# Patient Record
Sex: FEMALE | Race: WHITE | Employment: FULL TIME | URBAN - METROPOLITAN AREA
[De-identification: names, ages, dates, MRNs, and addresses within clinical notes are randomized per-mention and may not be internally consistent; named-entity substitution may affect disease eponyms.]

---

## 2017-01-16 ENCOUNTER — HOSPITAL ENCOUNTER (OUTPATIENT)
Dept: MAMMOGRAPHY | Age: 57
Discharge: HOME | End: 2017-01-16
Attending: FAMILY MEDICINE
Payer: COMMERCIAL

## 2017-01-16 DIAGNOSIS — Z12.31 ENCOUNTER FOR SCREENING MAMMOGRAM FOR BREAST CANCER: ICD-10-CM

## 2017-01-16 PROCEDURE — 77067 SCR MAMMO BI INCL CAD: CPT

## 2017-03-03 ENCOUNTER — OFFICE VISIT (OUTPATIENT)
Dept: FAMILY MEDICINE CLINIC | Age: 57
End: 2017-03-03

## 2017-03-03 VITALS
SYSTOLIC BLOOD PRESSURE: 152 MMHG | WEIGHT: 157.8 LBS | BODY MASS INDEX: 25.36 KG/M2 | DIASTOLIC BLOOD PRESSURE: 82 MMHG | HEART RATE: 77 BPM | RESPIRATION RATE: 16 BRPM | TEMPERATURE: 98 F | HEIGHT: 66 IN | OXYGEN SATURATION: 98 %

## 2017-03-03 DIAGNOSIS — I10 ESSENTIAL HYPERTENSION: ICD-10-CM

## 2017-03-03 DIAGNOSIS — F41.1 GAD (GENERALIZED ANXIETY DISORDER): Primary | ICD-10-CM

## 2017-03-03 DIAGNOSIS — M54.50 ACUTE BILATERAL LOW BACK PAIN WITHOUT SCIATICA: ICD-10-CM

## 2017-03-03 DIAGNOSIS — L98.9 SKIN PROBLEM: ICD-10-CM

## 2017-03-03 RX ORDER — FLUOXETINE 10 MG/1
20 CAPSULE ORAL DAILY
Qty: 60 CAP | Refills: 0 | Status: SHIPPED | OUTPATIENT
Start: 2017-03-03 | End: 2017-03-15 | Stop reason: DRUGHIGH

## 2017-03-03 NOTE — MR AVS SNAPSHOT
Visit Information Date & Time Provider Department Dept. Phone Encounter #  
 3/3/2017  2:40 PM Leo Spicer MD [address] [facility] [phone] [id number] Upcoming Health Maintenance Date Due Hepatitis C Screening 1960 DTaP/Tdap/Td series (1 - Tdap) 5/6/1981 FOBT Q 1 YEAR AGE 50-75 5/6/2010 PAP AKA CERVICAL CYTOLOGY 12/10/2017 BREAST CANCER SCRN MAMMOGRAM 1/16/2019 Allergies as of 3/3/2017  Review Complete On: 3/3/2017 By: Tegan Banda No Known Allergies Current Immunizations  Reviewed on 11/7/2016 Name Date Influenza Vaccine 10/14/2015 Influenza Vaccine (Quad) PF 11/7/2016 Not reviewed this visit You Were Diagnosed With   
  
 Codes Comments MARIA VICTORIA (generalized anxiety disorder)    -  Primary ICD-10-CM: F41.1 ICD-9-CM: 300.02 Essential hypertension     ICD-10-CM: I10 
ICD-9-CM: 401.9 Acute bilateral low back pain without sciatica     ICD-10-CM: M54.5 ICD-9-CM: 724.2, 338.19 Skin problem     ICD-10-CM: L98.9 ICD-9-CM: 709.9 Vitals BP  
  
  
  
  
  
 152/82 (BP 1 Location: Right arm, BP Patient Position: Sitting) BMI and BSA Data Body Mass Index Body Surface Area  
 25.47 kg/m 2 1.83 m 2 Preferred Pharmacy Pharmacy Name Phone [facility], [address] [phone] Your Updated Medication List  
  
   
This list is accurate as of: 3/3/17  3:30 PM.  Always use your most recent med list.  
  
  
  
  
 aspirin 81 mg Cpdr  
Take  by mouth. cyclobenzaprine 10 mg tablet Commonly known as:  FLEXERIL Take 1 Tab by mouth three (3) times daily as needed for Muscle Spasm(s). FLUoxetine 10 mg capsule Commonly known as:  PROzac Take 2 Caps by mouth daily. hydroCHLOROthiazide 25 mg tablet Commonly known as:  HYDRODIURIL  
TAKE ONE-HALF TABLET BY MOUTH EVERY DAY FOR FLUID  
  
 HYDROcodone-acetaminophen 5-325 mg per tablet Commonly known as:  Birda Cayuga Take 1 Tab by mouth every six (6) hours as needed for Pain. Max Daily Amount: 4 Tabs. meloxicam 15 mg tablet Commonly known as:  MOBIC Take 1 Tab by mouth daily. For back pain  
  
 methylPREDNISolone 4 mg tablet Commonly known as:  Cleta Loss Per dose pack instructions NexIUM 20 mg capsule Generic drug:  esomeprazole Take  by mouth daily. VALTREX 1 gram tablet Generic drug:  valACYclovir Take  by mouth. Prescriptions Sent to Pharmacy Refills FLUoxetine (PROZAC) 10 mg capsule 0 Sig: Take 2 Caps by mouth daily. Class: Normal  
 Pharmacy: [address] #: [phone] Route: Oral  
  
Introducing [facility]! Clarissa Hoang introduces [employer] patient portal. Now you can access parts of your medical record, email your doctor's office, and request medication refills online. 1. In your internet browser, go to [email or web address] 2. Click on the First Time User? Click Here link in the Sign In box. You will see the New Member Sign Up page. 3. Enter your [employer] Access Code exactly as it appears below. You will not need to use this code after youve completed the sign-up process. If you do not sign up before the expiration date, you must request a new code. · [employer] Access Code: [email or web address] Expires: 6/1/2017  3:30 PM 
 
4. Enter the last four digits of your Social Security Number (xxxx) and Date of Birth (mm/dd/yyyy) as indicated and click Submit. You will be taken to the next sign-up page. 5. Create a [employer] ID. This will be your [employer] login ID and cannot be changed, so think of one that is secure and easy to remember. 6. Create a [employer] password. You can change your password at any time. 7. Enter your Password Reset Question and Answer.  This can be used at a later time if you forget your password. 8. Enter your e-mail address. You will receive e-mail notification when new information is available in [address] 9. Click Sign Up. You can now view and download portions of your medical record. 10. Click the Download Summary menu link to download a portable copy of your medical information. If you have questions, please visit the Frequently Asked Questions section of the [employer] website. Remember, [employer] is NOT to be used for urgent needs. For medical emergencies, dial 911. Now available from your iPhone and Android! Please provide this summary of care documentation to your next provider. Your primary care clinician is listed as [address] If you have any questions after today's visit, please call [phone].

## 2017-03-03 NOTE — PROGRESS NOTES
.  Chief Complaint   Patient presents with    Spasms    Skin Problem     spot has been growing for the past 3 months    Bud Hansen

## 2017-03-03 NOTE — PROGRESS NOTES
RUDY  Jacinda Kumar is a 64 y.o. female who presents to follow-up on anxiety and discuss back pain. She was started on Prozac 10 mg about 3 months ago and was told to follow-up in 1 month but is only now getting around to it. Has not noticed much difference in her anxiety. Describes the fact that she has had anxiety her entire life. She gets easily overwhelmed, occasionally has a heart flutter that scares her. 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 She has been tried on SSRIs in the past but complains of a decrease in libido. She has never tried a therapist.    She has a spot on her back is worrying her, center of the back around the bra line that she describes as a discoloration. There is no pain. Occasional itching. No trauma to the area    Says that she has been having back problems for quite a while. There is no injury. Only seems to happen when she first wakes up in the morning. The pain is in the lower thoracic spine. Describes it as a shooting sensation if he twists the wrong way. She has been doing some stretches but mostly back extension stretches    PMHx:  Past Medical History:   Diagnosis Date    Depression 2/26/2010    Hypertension 2/26/2010       Meds:   Current Outpatient Prescriptions   Medication Sig Dispense Refill    FLUoxetine (PROZAC) 10 mg capsule Take 2 Caps by mouth daily. 60 Cap 0    valACYclovir (VALTREX) 1 gram tablet Take  by mouth.  hydrochlorothiazide (HYDRODIURIL) 25 mg tablet TAKE ONE-HALF TABLET BY MOUTH EVERY DAY FOR FLUID 30 Tab 5    meloxicam (MOBIC) 15 mg tablet Take 1 Tab by mouth daily. For back pain 30 Tab 2    esomeprazole (NEXIUM) 20 mg capsule Take  by mouth daily. Allergies:   No Known Allergies    Smoker:  History   Smoking Status    Never Smoker   Smokeless Tobacco    Never Used       ETOH:   History   Alcohol Use No       FH: No family history on file.     ROS:  As listed in HPI. In addition:  Constitutional:   No headache, fever, fatigue, weight loss or weight gain      Cardiac:    No chest pain      Resp:   No cough or shortness of breath      Neuro   No loss of consciousness, dizziness, seizures      Physical Exam:  Blood pressure 152/82, pulse 77, temperature 98 °F (36.7 °C), resp. rate 16, height 5' 6\" (1.676 m), weight 157 lb 12.8 oz (71.6 kg), SpO2 98 %. GEN: No apparent distress. Alert and oriented and responds to all questions appropriately. NECK:  Supple; no masses; thyroid normal           LUNGS: Respirations unlabored; clear to auscultation bilaterally  CARDIOVASCULAR: Regular rate and rhythm without murmurs   ABDOMEN: Soft; nontender; nondistended; normoactive bowel sounds; no masses or organomegaly  NEUROLOGIC:  No focal neurologic deficits. Strength and sensation grossly intact. Coordination and gait grossly intact. MSK:  Superior trapezius is mildly tender to palpation, tight erector spinae muscles that are not particularly tender to palpation, in fact no part of her back is tender to palpation at the moment  SKIN: The center of her back in the area of her bra line there is a 8 cm tall by 5 cm wide patch of very subtle eczema       Assessment and Plan     MARIA VICTORIA  Needs a higher dose of Prozac, described a ramp-up using 10 mg tablets up to 40 mg over the next 3 weeks  Needs a therapist, I forgot to give her a list of therapists in the area  Encouraged her to engage in cardiovascular exercise which should help with both anxiety and back pain  Follow-up one month    The back pain that happens first thing in the morning sounds like it is due to a mattress issue, she is also not stretching it out probably, she is extending her back to try to relieve pain which is feeding into the cramp. Educated on back flexion exercises and stretches    The spot on her back is a light eczema, there are no other symptoms to provide reassurance.   If starts to itch can use an over-the-counter steroid cream.  Can use a moisturizing cream in the meantime    Hypertension, a little high today. Has been borderline in the recent past  Recheck in 1 month. If it is still high consider increasing blood pressure medicine        ICD-10-CM ICD-9-CM    1. MARIA VICTORIA (generalized anxiety disorder) F41.1 300.02 FLUoxetine (PROZAC) 10 mg capsule   2. Essential hypertension I10 401.9    3. Acute bilateral low back pain without sciatica M54.5 724.2      338.19    4. Skin problem L98.9 709.9        AVS given.  Pt expressed understanding of instructions

## 2017-03-15 DIAGNOSIS — F41.1 GAD (GENERALIZED ANXIETY DISORDER): ICD-10-CM

## 2017-03-15 RX ORDER — FLUOXETINE 10 MG/1
20 CAPSULE ORAL DAILY
Qty: 60 CAP | Refills: 0 | Status: CANCELLED | OUTPATIENT
Start: 2017-03-15

## 2017-03-15 RX ORDER — FLUOXETINE HYDROCHLORIDE 40 MG/1
40 CAPSULE ORAL DAILY
Qty: 90 CAP | Refills: 3 | Status: SHIPPED | OUTPATIENT
Start: 2017-03-15 | End: 2018-03-29 | Stop reason: SDUPTHER

## 2017-05-05 ENCOUNTER — OFFICE VISIT (OUTPATIENT)
Dept: FAMILY MEDICINE CLINIC | Age: 57
End: 2017-05-05

## 2017-05-05 VITALS
WEIGHT: 155 LBS | DIASTOLIC BLOOD PRESSURE: 79 MMHG | HEIGHT: 66 IN | RESPIRATION RATE: 17 BRPM | SYSTOLIC BLOOD PRESSURE: 155 MMHG | OXYGEN SATURATION: 96 % | HEART RATE: 76 BPM | TEMPERATURE: 98.9 F | BODY MASS INDEX: 24.91 KG/M2

## 2017-05-05 DIAGNOSIS — L03.011 CELLULITIS OF FINGER OF RIGHT HAND: Primary | ICD-10-CM

## 2017-05-05 RX ORDER — CEPHALEXIN 500 MG/1
500 CAPSULE ORAL 2 TIMES DAILY
Qty: 20 CAP | Refills: 0 | Status: SHIPPED | OUTPATIENT
Start: 2017-05-05 | End: 2017-05-15

## 2017-05-05 NOTE — PATIENT INSTRUCTIONS

## 2017-05-05 NOTE — PROGRESS NOTES
Subjective:   Alek Pérez is a 64 y.o. female who complains of first right finger red, warm and swollen for several days. No recalled trauma. No open wound. Was weeding this week and may have pricked herself on a shashi leaf. Not particularly sore, good range of motion. Past Medical History:   Diagnosis Date    Depression 2/26/2010    Hypertension 2/26/2010     Social History   Substance Use Topics    Smoking status: Never Smoker    Smokeless tobacco: Never Used    Alcohol use No     Outpatient Prescriptions Marked as Taking for the 5/5/17 encounter (Office Visit) with Anjelica Beasley MD   Medication Sig Dispense Refill    cephALEXin (KEFLEX) 500 mg capsule Take 1 Cap by mouth two (2) times a day for 10 days. 20 Cap 0    FLUoxetine (PROZAC) 40 mg capsule Take 1 Cap by mouth daily. 90 Cap 3    valACYclovir (VALTREX) 1 gram tablet Take  by mouth.  hydrochlorothiazide (HYDRODIURIL) 25 mg tablet TAKE ONE-HALF TABLET BY MOUTH EVERY DAY FOR FLUID 30 Tab 5    meloxicam (MOBIC) 15 mg tablet Take 1 Tab by mouth daily. For back pain 30 Tab 2    esomeprazole (NEXIUM) 20 mg capsule Take  by mouth daily. No Known Allergies     Review of Systems  A comprehensive review of systems was negative except for that written in the HPI. Objective:     Visit Vitals    /79 (BP 1 Location: Left arm, BP Patient Position: Sitting)    Pulse 76    Temp 98.9 °F (37.2 °C) (Oral)    Resp 17    Ht 5' 6\" (1.676 m)    Wt 155 lb (70.3 kg)    LMP  (LMP Unknown)    SpO2 96%    BMI 25.02 kg/m2     General:   alert, cooperative and no distress   Eyes: conjunctivae/scleras clear. PERRL, EOM's intact                   Heart: S1 and S2 normal,no murmurs noted    Lungs: Clear to auscultation bilaterally, no increased work of breathing       Extremities: Right first finger swollen and red, slightly warm on palmar side, no extension to dorsal surface. Full painless ROM across all joints.   No area of fluctuance, nail and cuticle are not affected. No openwound. Assessment/Plan:   1. Cellulitis of finger of right hand  Cautioned about signs of joint or systemic infection. Start keflex. Follow up prn. Orders Placed This Encounter    cephALEXin (KEFLEX) 500 mg capsule     Sig: Take 1 Cap by mouth two (2) times a day for 10 days. Dispense:  20 Cap     Refill:  0       Verbal and written instructions (see AVS) provided. Patient expresses understanding of diagnosis and treatment plan.

## 2017-05-05 NOTE — MR AVS SNAPSHOT
Visit Information Date & Time Provider Department Dept. Phone Encounter #  
 5/5/2017  1:15 PM Ayad Bear, [address] [facility] [phone] [id number] Upcoming Health Maintenance Date Due Hepatitis C Screening 1960 DTaP/Tdap/Td series (1 - Tdap) 5/6/1981 FOBT Q 1 YEAR AGE 50-75 5/6/2010 INFLUENZA AGE 9 TO ADULT 8/1/2017 PAP AKA CERVICAL CYTOLOGY 12/10/2017 BREAST CANCER SCRN MAMMOGRAM 1/16/2019 Allergies as of 5/5/2017  Review Complete On: 5/5/2017 By: Joseph Avendano No Known Allergies Current Immunizations  Reviewed on 11/7/2016 Name Date Influenza Vaccine 10/14/2015 Influenza Vaccine (Quad) PF 11/7/2016 Not reviewed this visit Vitals BP Pulse Temp Resp Height(growth percentile) Weight(growth percentile) 155/79 (BP 1 Location: Left arm, BP Patient Position: Sitting) 76 98.9 °F (37.2 °C) (Oral) 17 5' 6\" (1.676 m) 155 lb (70.3 kg) LMP SpO2 BMI OB Status Smoking Status (LMP Unknown) 96% 25.02 kg/m2 Postmenopausal Never Smoker BMI and BSA Data Body Mass Index Body Surface Area 25.02 kg/m 2 1.81 m 2 Preferred Pharmacy Pharmacy Name Phone [facility], [address] [phone] Your Updated Medication List  
  
   
This list is accurate as of: 5/5/17  1:33 PM.  Always use your most recent med list.  
  
  
  
  
 cephALEXin 500 mg capsule Commonly known as:  Fauzia Maizes Take 1 Cap by mouth two (2) times a day for 10 days. FLUoxetine 40 mg capsule Commonly known as:  PROzac Take 1 Cap by mouth daily. hydroCHLOROthiazide 25 mg tablet Commonly known as:  HYDRODIURIL  
TAKE ONE-HALF TABLET BY MOUTH EVERY DAY FOR FLUID  
  
 meloxicam 15 mg tablet Commonly known as:  MOBIC Take 1 Tab by mouth daily. For back pain NexIUM 20 mg capsule Generic drug:  esomeprazole Take  by mouth daily. VALTREX 1 gram tablet Generic drug:  valACYclovir Take  by mouth. Prescriptions Sent to Pharmacy Refills  
 cephALEXin (KEFLEX) 500 mg capsule 0 Sig: Take 1 Cap by mouth two (2) times a day for 10 days. Class: Normal  
 Pharmacy: [facility] #: [phone] Route: Oral  
  
Patient Instructions Cellulitis: Care Instructions Your Care Instructions Cellulitis is a skin infection. It often occurs after a break in the skin from a scrape, cut, bite, or puncture, or after a rash. The doctor has checked you carefully, but problems can develop later. If you notice any problems or new symptoms, get medical treatment right away. Follow-up care is a key part of your treatment and safety. Be sure to make and go to all appointments, and call your doctor if you are having problems. It's also a good idea to know your test results and keep a list of the medicines you take. How can you care for yourself at home? · Take your antibiotics as directed. Do not stop taking them just because you feel better. You need to take the full course of antibiotics. · Prop up the infected area on pillows to reduce pain and swelling. Try to keep the area above the level of your heart as often as you can. · If your doctor told you how to care for your wound, follow your doctor's instructions. If you did not get instructions, follow this general advice: ¨ Wash the wound with clean water 2 times a day. Don't use hydrogen peroxide or alcohol, which can slow healing. ¨ You may cover the wound with a thin layer of petroleum jelly, such as Vaseline, and a nonstick bandage. ¨ Apply more petroleum jelly and replace the bandage as needed. · Be safe with medicines. Take pain medicines exactly as directed. ¨ If the doctor gave you a prescription medicine for pain, take it as prescribed. ¨ If you are not taking a prescription pain medicine, ask your doctor if you can take an over-the-counter medicine. To prevent cellulitis in the future · Try to prevent cuts, scrapes, or other injuries to your skin. Cellulitis most often occurs where there is a break in the skin. · If you get a scrape, cut, mild burn, or bite, wash the wound with clean water as soon as you can to help avoid infection. Don't use hydrogen peroxide or alcohol, which can slow healing. · If you have swelling in your legs (edema), support stockings and good skin care may help prevent leg sores and cellulitis. · Take care of your feet, especially if you have diabetes or other conditions that increase the risk of infection. Wear shoes and socks. Do not go barefoot. If you have athlete's foot or other skin problems on your feet, talk to your doctor about how to treat them. When should you call for help? Call your doctor now or seek immediate medical care if: 
· You have signs that your infection is getting worse, such as: 
¨ Increased pain, swelling, warmth, or redness. ¨ Red streaks leading from the area. ¨ Pus draining from the area. ¨ A fever. · You get a rash. Watch closely for changes in your health, and be sure to contact your doctor if: 
· You are not getting better after 1 day (24 hours). · You do not get better as expected. Where can you learn more? Go to http://phyllis-harsh.info/. Randell Cerna in the search box to learn more about \"Cellulitis: Care Instructions. \" Current as of: October 13, 2016 Content Version: 11.2 © 6859-6746 Inversiones.com. Care instructions adapted under license by [employer] (which disclaims liability or warranty for this information). If you have questions about a medical condition or this instruction, always ask your healthcare professional. [address] any warranty or liability for your use of this information. Introducing [facility]! Linda Cleverly introduces [employer] patient portal. Now you can access parts of your medical record, email your doctor's office, and request medication refills online. 1. In your internet browser, go to [email or web address] 2. Click on the First Time User? Click Here link in the Sign In box. You will see the New Member Sign Up page. 3. Enter your [employer] Access Code exactly as it appears below. You will not need to use this code after youve completed the sign-up process. If you do not sign up before the expiration date, you must request a new code. · [employer] Access Code: [email or web address] Expires: 6/1/2017  4:30 PM 
 
4. Enter the last four digits of your Social Security Number (xxxx) and Date of Birth (mm/dd/yyyy) as indicated and click Submit. You will be taken to the next sign-up page. 5. Create a [employer]bit ID. This will be your [employer] login ID and cannot be changed, so think of one that is secure and easy to remember. 6. Create a [employer] password. You can change your password at any time. 7. Enter your Password Reset Question and Answer. This can be used at a later time if you forget your password. 8. Enter your e-mail address. You will receive e-mail notification when new information is available in [address] 9. Click Sign Up. You can now view and download portions of your medical record. 10. Click the Download Summary menu link to download a portable copy of your medical information. If you have questions, please visit the Frequently Asked Questions section of the [employer] website. Remember, [employer] is NOT to be used for urgent needs. For medical emergencies, dial 911. Now available from your iPhone and Android! Please provide this summary of care documentation to your next provider. Your primary care clinician is listed as Farnaz Burns. If you have any questions after today's visit, please call [phone].

## 2017-05-08 ENCOUNTER — TELEPHONE (OUTPATIENT)
Dept: FAMILY MEDICINE CLINIC | Age: 57
End: 2017-05-08

## 2017-05-08 ENCOUNTER — OFFICE VISIT (OUTPATIENT)
Dept: FAMILY MEDICINE CLINIC | Age: 57
End: 2017-05-08

## 2017-05-08 VITALS
TEMPERATURE: 98.1 F | HEIGHT: 66 IN | BODY MASS INDEX: 24.85 KG/M2 | DIASTOLIC BLOOD PRESSURE: 79 MMHG | OXYGEN SATURATION: 97 % | SYSTOLIC BLOOD PRESSURE: 157 MMHG | WEIGHT: 154.6 LBS | HEART RATE: 75 BPM

## 2017-05-08 DIAGNOSIS — L03.011 CELLULITIS OF FINGER OF RIGHT HAND: Primary | ICD-10-CM

## 2017-05-08 RX ORDER — SULFAMETHOXAZOLE AND TRIMETHOPRIM 800; 160 MG/1; MG/1
1 TABLET ORAL 2 TIMES DAILY
Qty: 20 TAB | Refills: 0 | Status: SHIPPED | OUTPATIENT
Start: 2017-05-08 | End: 2017-05-18

## 2017-05-08 NOTE — TELEPHONE ENCOUNTER
Pt hand is now swollen and would like to know what she should do next. Antibiotics prescribed last week has helped finger but now swelling is in hand. Delvis Matos

## 2017-05-08 NOTE — TELEPHONE ENCOUNTER
Appt with Dr. Shanae Melara tomorrow 5-9-17. Arrive at 1:10 for a 1:30 appt.  [address]. Attached to parking deck. Records faxed to [phone].

## 2017-05-08 NOTE — MR AVS SNAPSHOT
Visit Information Date & Time Provider Department Dept. Phone Encounter #  
 5/8/2017  1:00 PM Constance Boland, [address] [address] [phone] [id number] Upcoming Health Maintenance Date Due Hepatitis C Screening 1960 DTaP/Tdap/Td series (1 - Tdap) 5/6/1981 FOBT Q 1 YEAR AGE 50-75 5/6/2010 INFLUENZA AGE 9 TO ADULT 8/1/2017 PAP AKA CERVICAL CYTOLOGY 12/10/2017 BREAST CANCER SCRN MAMMOGRAM 1/16/2019 Allergies as of 5/8/2017  Review Complete On: 5/8/2017 By: Constance Boland MD  
 No Known Allergies Current Immunizations  Reviewed on 11/7/2016 Name Date Influenza Vaccine 10/14/2015 Influenza Vaccine (Quad) PF 11/7/2016 Not reviewed this visit Vitals BP Pulse Temp Height(growth percentile) Weight(growth percentile) LMP  
 157/79 (BP 1 Location: Left arm, BP Patient Position: Sitting) 75 98.1 °F (36.7 °C) 5' 6\" (1.676 m) 154 lb 9.6 oz (70.1 kg) (LMP Unknown) SpO2 BMI OB Status Smoking Status 97% 24.95 kg/m2 Postmenopausal Never Smoker Vitals History BMI and BSA Data Body Mass Index Body Surface Area 24.95 kg/m 2 1.81 m 2 Preferred Pharmacy Pharmacy Name Phone [facility], [address] [phone] Your Updated Medication List  
  
   
This list is accurate as of: 5/8/17  1:49 PM.  Always use your most recent med list.  
  
  
  
  
 cephALEXin 500 mg capsule Commonly known as:  Prieto Norris Take 1 Cap by mouth two (2) times a day for 10 days. FLUoxetine 40 mg capsule Commonly known as:  PROzac Take 1 Cap by mouth daily. hydroCHLOROthiazide 25 mg tablet Commonly known as:  HYDRODIURIL  
TAKE ONE-HALF TABLET BY MOUTH EVERY DAY FOR FLUID  
  
 meloxicam 15 mg tablet Commonly known as:  MOBIC Take 1 Tab by mouth daily. For back pain NexIUM 20 mg capsule Generic drug:  esomeprazole Take  by mouth daily. trimethoprim-sulfamethoxazole 160-800 mg per tablet Commonly known as:  BACTRIM DS, SEPTRA DS Take 1 Tab by mouth two (2) times a day for 10 days. VALTREX 1 gram tablet Generic drug:  valACYclovir Take  by mouth. Prescriptions Sent to Pharmacy Refills  
 trimethoprim-sulfamethoxazole (BACTRIM DS, SEPTRA DS) 160-800 mg per tablet 0 Sig: Take 1 Tab by mouth two (2) times a day for 10 days. Class: Normal  
 Pharmacy: [facility] [address] [place] [address] #: [phone] Route: Oral  
  
Patient Instructions We will call you with an appointment when we hear from [facility] later today. If you have heard nothing by 530 pm, please go to Ortho On Call at [facility] to get orthopedic evaluation. Introducing [facility]! [facility] introduces [employer] patient portal. Now you can access parts of your medical record, email your doctor's office, and request medication refills online. 1. In your internet browser, go to [email or web address] 2. Click on the First Time User? Click Here link in the Sign In box. You will see the New Member Sign Up page. 3. Enter your [employer] Access Code exactly as it appears below. You will not need to use this code after youve completed the sign-up process. If you do not sign up before the expiration date, you must request a new code. · [employer] Access Code: [email or web address] Expires: 6/1/2017  4:30 PM 
 
4. Enter the last four digits of your Social Security Number (xxxx) and Date of Birth (mm/dd/yyyy) as indicated and click Submit. You will be taken to the next sign-up page. 5. Create a LiveAir Networkst ID. This will be your [employer] login ID and cannot be changed, so think of one that is secure and easy to remember. 6. Create a [employer] password. You can change your password at any time. 7. Enter your Password Reset Question and Answer.  This can be used at a later time if you forget your password. 8. Enter your e-mail address. You will receive e-mail notification when new information is available in [address] 9. Click Sign Up. You can now view and download portions of your medical record. 10. Click the Download Summary menu link to download a portable copy of your medical information. If you have questions, please visit the Frequently Asked Questions section of the [employer] website. Remember, [employer] is NOT to be used for urgent needs. For medical emergencies, dial 911. Now available from your iPhone and Android! Please provide this summary of care documentation to your next provider. Your primary care clinician is listed as Mode Hoffmann. If you have any questions after today's visit, please call [phone].

## 2017-05-08 NOTE — PATIENT INSTRUCTIONS
We will call you with an appointment when we hear from [facility] later today. If you have heard nothing by 530 pm, please go to [facility] at [address] [place] [address] to get orthopedic evaluation.

## 2017-05-08 NOTE — PROGRESS NOTES
58yo with right 1st finger swelling and pain. Seen Friday and had some redness and swelling of palmar surface of first finger - thought she may have been pricked by a thorn when gardening earlier in the week. Took keflex and started to feel better, swelling improved a little. Then woken from sleep last night by 1st MCP joint pain with some redness and swelling. Today, the pain is not throbbing but she \"knows it's there\" and was concerned that the swelling had spread so she came in for follow up. She denies fever or chills. Past Medical History, Surgical History, and Social History reviewed. Current Outpatient Prescriptions on File Prior to Visit   Medication Sig Dispense Refill    hydroCHLOROthiazide (HYDRODIURIL) 25 mg tablet TAKE ONE-HALF TABLET BY MOUTH EVERY DAY FOR FLUID 30 Tab 11    cephALEXin (KEFLEX) 500 mg capsule Take 1 Cap by mouth two (2) times a day for 10 days. 20 Cap 0    FLUoxetine (PROZAC) 40 mg capsule Take 1 Cap by mouth daily. 90 Cap 3    valACYclovir (VALTREX) 1 gram tablet Take  by mouth.  meloxicam (MOBIC) 15 mg tablet Take 1 Tab by mouth daily. For back pain 30 Tab 2    esomeprazole (NEXIUM) 20 mg capsule Take  by mouth daily. No current facility-administered medications on file prior to visit. No Known Allergies    Visit Vitals    /79 (BP 1 Location: Left arm, BP Patient Position: Sitting)    Pulse 75    Temp 98.1 °F (36.7 °C)    Ht 5' 6\" (1.676 m)    Wt 154 lb 9.6 oz (70.1 kg)    LMP  (LMP Unknown)    SpO2 97%    BMI 24.95 kg/m2     Gen: alert, oriented, no acute distress  Lungs: no increased respiratory effort, clear to ausculation bilaterally  Heart: regular rate and rhythm, no murmurs, rubs or gallops  Extremities:right 1st finger swollen and red. FROM except as limited by swelling. Redness now encompasses right 1st MCP joint. No tenderness to palpation of PIP or DIP, just some at MCP. No laceration or abrasion. Assessment/Plan:    1. Cellulitis of finger of right hand  Concerning spread of swelling/redness. Minimal heat. Add bactrim for coverage of strep. Arrange ortho hand follow up. Called [facility] and [facility] and no one was able to offer appt. Left message at [facility] and instructed patient to go to [facility] on call if no response by 530. Orders Placed This Encounter    trimethoprim-sulfamethoxazole (BACTRIM DS, SEPTRA DS) 160-800 mg per tablet     Sig: Take 1 Tab by mouth two (2) times a day for 10 days. Dispense:  20 Tab     Refill:  0       Current Outpatient Prescriptions   Medication Sig Dispense Refill    trimethoprim-sulfamethoxazole (BACTRIM DS, SEPTRA DS) 160-800 mg per tablet Take 1 Tab by mouth two (2) times a day for 10 days. 20 Tab 0    hydroCHLOROthiazide (HYDRODIURIL) 25 mg tablet TAKE ONE-HALF TABLET BY MOUTH EVERY DAY FOR FLUID 30 Tab 11    cephALEXin (KEFLEX) 500 mg capsule Take 1 Cap by mouth two (2) times a day for 10 days. 20 Cap 0    FLUoxetine (PROZAC) 40 mg capsule Take 1 Cap by mouth daily. 90 Cap 3    valACYclovir (VALTREX) 1 gram tablet Take  by mouth.  meloxicam (MOBIC) 15 mg tablet Take 1 Tab by mouth daily. For back pain 30 Tab 2    esomeprazole (NEXIUM) 20 mg capsule Take  by mouth daily. Verbal and written instructions (see AVS) provided. Patient expresses understanding of diagnosis and treatment plan.

## 2017-05-08 NOTE — TELEPHONE ENCOUNTER
Gildardo Harden returning [employer] call. She can be reached at [phone]. If she is unavailable, please press 0 and have the  page her.

## 2017-05-10 ENCOUNTER — TELEPHONE (OUTPATIENT)
Dept: FAMILY MEDICINE CLINIC | Age: 57
End: 2017-05-10

## 2017-05-10 RX ORDER — ONDANSETRON 4 MG/1
4 TABLET, ORALLY DISINTEGRATING ORAL
Qty: 20 TAB | Refills: 0 | Status: SHIPPED | OUTPATIENT
Start: 2017-05-10 | End: 2017-10-05

## 2017-05-11 ENCOUNTER — TELEPHONE (OUTPATIENT)
Dept: FAMILY MEDICINE CLINIC | Age: 57
End: 2017-05-11

## 2017-05-11 NOTE — TELEPHONE ENCOUNTER
Discussed with Jenniffer-we can switch Bactrim for Doxycycline, but common side effect of doxy is nausea.

## 2017-05-11 NOTE — TELEPHONE ENCOUNTER
Pt explained she is still very ill and nauseated. She is requesting for a different antibiotic to be sent to [facility]. She has not discontinued med for fear of swelling to come back to hand.   Ac Patrick

## 2017-05-17 ENCOUNTER — TELEPHONE (OUTPATIENT)
Dept: FAMILY MEDICINE CLINIC | Age: 57
End: 2017-05-17

## 2017-05-17 NOTE — TELEPHONE ENCOUNTER
Pt states that she is taking bactrim once daily now due to nausea and feeling tired, is taking Zofran, which is not really working. She vomited only once last week. She is also eating before taking medication and drinking lots of water. Pt wants to know what to do. Routing to Dr Ly Pfeiffer.

## 2017-05-17 NOTE — TELEPHONE ENCOUNTER
Patient would like to speak to a nurse but did not say what she wanted.  She can be reached at [phone]

## 2017-06-18 NOTE — TELEPHONE ENCOUNTER
This message was never closed by the last clinical person that touch it. Please close the encounter so it will drop out of the PSR's in basket.     Thank you

## 2017-06-22 ENCOUNTER — OFFICE VISIT (OUTPATIENT)
Dept: FAMILY MEDICINE CLINIC | Age: 57
End: 2017-06-22

## 2017-06-22 VITALS
WEIGHT: 153.6 LBS | HEIGHT: 66 IN | HEART RATE: 68 BPM | TEMPERATURE: 97.9 F | BODY MASS INDEX: 24.68 KG/M2 | SYSTOLIC BLOOD PRESSURE: 150 MMHG | DIASTOLIC BLOOD PRESSURE: 78 MMHG | RESPIRATION RATE: 18 BRPM | OXYGEN SATURATION: 97 %

## 2017-06-22 DIAGNOSIS — R39.15 URINARY URGENCY: ICD-10-CM

## 2017-06-22 DIAGNOSIS — R35.0 URINARY FREQUENCY: Primary | ICD-10-CM

## 2017-06-22 DIAGNOSIS — R10.2 SUPRAPUBIC DISCOMFORT: ICD-10-CM

## 2017-06-22 LAB
BILIRUB UR QL STRIP: NEGATIVE
GLUCOSE UR-MCNC: NEGATIVE MG/DL
KETONES P FAST UR STRIP-MCNC: NEGATIVE MG/DL
PH UR STRIP: 5 [PH] (ref 4.6–8)
PROT UR QL STRIP: NEGATIVE MG/DL
SP GR UR STRIP: 1.02 (ref 1–1.03)
UA UROBILINOGEN AMB POC: NORMAL (ref 0.2–1)
URINALYSIS CLARITY POC: CLEAR
URINALYSIS COLOR POC: YELLOW
URINE BLOOD POC: NEGATIVE
URINE LEUKOCYTES POC: NEGATIVE
URINE NITRITES POC: NEGATIVE

## 2017-06-22 RX ORDER — DOXYCYCLINE 100 MG/1
100 CAPSULE ORAL 2 TIMES DAILY
Qty: 20 CAP | Refills: 0 | Status: SHIPPED | OUTPATIENT
Start: 2017-06-22 | End: 2017-07-02

## 2017-06-22 NOTE — PATIENT INSTRUCTIONS
Urethritis: Care Instructions  Your Care Instructions    Urethritis is an infection of the tube that takes urine from the bladder to the outside of the body. This tube is called the urethra. The infection is often caused by bacteria. This can happen if you have a sexually transmitted infection (STI). But a virus may also be a cause. Urethritis is usually treated with antibiotics. Most cases clear up with treatment. Proper treatment is very important. If you don't treat it, the infection can lead to lasting damage of the urethra. Other parts of the urinary system can also be damaged. Follow-up care is a key part of your treatment and safety. Be sure to make and go to all appointments, and call your doctor if you are having problems. It's also a good idea to know your test results and keep a list of the medicines you take. How can you care for yourself at home? · If your doctor prescribed antibiotics, take them as directed. Do not stop taking them just because you feel better. You need to take the full course of antibiotics. · Take an over-the-counter pain medicine, such as acetaminophen (Tylenol), ibuprofen (Advil, Motrin), or naproxen (Aleve), if needed. Be safe with medicines. Read and follow all instructions on the label. · Do not take two or more pain medicines at the same time unless the doctor told you to. Many pain medicines have acetaminophen, which is Tylenol. Too much acetaminophen (Tylenol) can be harmful. · Your doctor may have you take phenazopyridine (Pyridium). This is a pain medicine for the urinary tract. It can turn your urine a deep red-orange. This is normal. Call your doctor if you think you are having a problem with your medicine. · Do not have sex until you are done with treatment. If you do have sex, be sure to use a condom. Your sex partner or partners should be tested too if your urethritis was caused by an STI.   · If your infection was caused by an injury or chemicals, avoid those things if you can. When should you call for help? Call your doctor now or seek immediate medical care if:  · You can't urinate. · You have symptoms of a urinary infection. For example:  ¨ You have blood or pus in your urine. ¨ You have pain in your back just below your rib cage. This is called flank pain. ¨ You have a fever, chills, or body aches. ¨ It hurts to urinate. ¨ You have groin or belly pain. · You have a hard time urinating when your bladder is full. · You notice mental changes or feel confused. Watch closely for changes in your health, and be sure to contact your doctor if:  · You do not get better as expected. Where can you learn more? Go to http://phyllis-harsh.info/. Enter W067 in the search box to learn more about \"Urethritis: Care Instructions. \"  Current as of: August 12, 2016  Content Version: 11.3  © 5647-0710 [employer]. Care instructions adapted under license by [employer] (which disclaims liability or warranty for this information). If you have questions about a medical condition or this instruction, always ask your healthcare professional. [address] any warranty or liability for your use of this information.

## 2017-06-22 NOTE — PROGRESS NOTES
Subjective:      Chief Complaint   Patient presents with    Urinary Frequency       Patient : This patient is a 62 y.o. female that presents today with a chief complaint urgency and frequency, no dysuria. She says that she has had a HA and feeling tired, yesterday had some stomach cramps \"gas pain, like I was constipated. I went to the bathroom and the cramps went away\". Symptoms started about three days ago. The patient denies nausea, vomiting, and fever. Has taken some aleve for HA the past couple days. The patient has no history of recent or recurrent UTIs. She was on Bactrim about a month ago for a hand infection. Says that she had a left over Cephalexin (which she was on prior to the bactrim)  and took 2 doses yesterday to treat potential UTI. No vaginal discharge or odor. Has not had intercourse for about a month. Past Medical History:   Diagnosis Date    Depression 2/26/2010    Hypertension 2/26/2010     Current Outpatient Prescriptions   Medication Sig    ondansetron (ZOFRAN ODT) 4 mg disintegrating tablet Take 1 Tab by mouth every eight (8) hours as needed for Nausea.  hydroCHLOROthiazide (HYDRODIURIL) 25 mg tablet TAKE ONE-HALF TABLET BY MOUTH EVERY DAY FOR FLUID    FLUoxetine (PROZAC) 40 mg capsule Take 1 Cap by mouth daily.  valACYclovir (VALTREX) 1 gram tablet Take  by mouth.  meloxicam (MOBIC) 15 mg tablet Take 1 Tab by mouth daily. For back pain    esomeprazole (NEXIUM) 20 mg capsule Take  by mouth daily. No current facility-administered medications for this visit. No Known Allergies  Social History   Substance Use Topics    Smoking status: Never Smoker    Smokeless tobacco: Never Used    Alcohol use No       ROS per HPI.     Objective:     Visit Vitals    /78 (BP 1 Location: Left arm, BP Patient Position: Sitting)    Pulse 68    Temp 97.9 °F (36.6 °C) (Oral)    Resp 18    Ht 5' 6\" (1.676 m)    Wt 153 lb 9.6 oz (69.7 kg)    LMP  (LMP Unknown)    SpO2 97%    BMI 24.79 kg/m2       Skin: no pallor, normal turgor  HEENT:  Normocephalic, no conjunctival inflammation, pupils equal round and reactive to light, MMM, no oral lesions  Heart: regular rate and rhythm, no murmurs, rubs or gallops  Lungs: no increased respiratory effort, clear to ausculation bilaterally  Abdomen: soft, mild suprapubic tenderness, not distended. Normal bowel sounds. No rebound or guarding. No bruits. Back: no costovertebral angle tenderness  Extremities:no edema or cyanosis  Neuro awake, alert and oriented    Results for orders placed or performed in visit on 06/22/17   AMB POC URINALYSIS DIP STICK AUTO W/O MICRO     Status: None   Result Value Ref Range Status    Color (UA POC) Yellow  Final    Clarity (UA POC) Clear  Final    Glucose (UA POC) Negative Negative Final    Bilirubin (UA POC) Negative Negative Final    Ketones (UA POC) Negative Negative Final    Specific gravity (UA POC) 1.025 1.001 - 1.035 Final    Blood (UA POC) Negative Negative Final    pH (UA POC) 5.0 4.6 - 8.0 Final    Protein (UA POC) Negative Negative mg/dL Final    Urobilinogen (UA POC) 0.2 mg/dL 0.2 - 1 Final    Nitrites (UA POC) Negative Negative Final    Leukocyte esterase (UA POC) Negative Negative Final       Assessment:   Differential diagnosis and treatment options reviewed with patient who is in agreement with treatment plan as outlined below. ICD-10-CM ICD-9-CM    1. Urinary frequency R35.0 788.41 AMB POC URINALYSIS DIP STICK AUTO W/O MICRO      CULTURE, URINE      CT/NG/T.VAGINALIS AMPLIFICATION      doxycycline (MONODOX) 100 mg capsule   2. Suprapubic discomfort R10.2 789.09    3. Urinary urgency R39.15 788.63 doxycycline (MONODOX) 100 mg capsule       ICD-10-CM ICD-9-CM    1. Urinary frequency R35.0 788.41 AMB POC URINALYSIS DIP STICK AUTO W/O MICRO      CULTURE, URINE      CT/NG/T.VAGINALIS AMPLIFICATION      doxycycline (MONODOX) 100 mg capsule   2. Suprapubic discomfort R10.2 789.09    3. Urinary urgency R39.15 788.63 doxycycline (MONODOX) 100 mg capsule     UA in office negative. Increase water hydration. May take Azo OTC for urinary symptom relief. Will send urine for culture and STI check to rule out other causes of urinary symptoms. UA may be affected by antibiotic taken yesterday. Will start on Doxy for potential Urethritis. Medication profile discussed with patient. Encouraged to take with food and avoid sun exposure while on medication. Discussed BMI and healthy weight. Encouraged patient to work to implement changes including diet high in raw fruits and vegetables, lean protein and good fats. Limit refined, processed carbohydrates and sugar. Encouraged regular exercise. Call/follow up  if any new symptoms or worsening of symptoms. Verbal and written instructions (see AVS) provided. Patient expresses understanding of diagnosis and treatment plan.

## 2017-06-22 NOTE — MR AVS SNAPSHOT
Visit Information Date & Time Provider Department Dept. Phone Encounter #  
 6/22/2017 10:00 AM Luan Gutierrez NP [address] [facility] 219 [phone] [id number] Upcoming Health Maintenance Date Due Hepatitis C Screening 1960 DTaP/Tdap/Td series (1 - Tdap) 5/6/1981 FOBT Q 1 YEAR AGE 50-75 5/6/2010 INFLUENZA AGE 9 TO ADULT 8/1/2017 PAP AKA CERVICAL CYTOLOGY 12/10/2017 BREAST CANCER SCRN MAMMOGRAM 1/16/2019 Allergies as of 6/22/2017  Review Complete On: 6/22/2017 By: Lior Otero LPN No Known Allergies Current Immunizations  Reviewed on 11/7/2016 Name Date Influenza Vaccine 10/14/2015 Influenza Vaccine (Quad) PF 11/7/2016 Not reviewed this visit You Were Diagnosed With   
  
 Codes Comments Urinary frequency    -  Primary ICD-10-CM: R35.0 ICD-9-CM: 788.41 Suprapubic discomfort     ICD-10-CM: R10.2 ICD-9-CM: 789.09 Urinary urgency     ICD-10-CM: R39.15 ICD-9-CM: 816.14 Vitals BP Pulse Temp Resp Height(growth percentile) Weight(growth percentile) 150/78 (BP 1 Location: Left arm, BP Patient Position: Sitting) 68 97.9 °F (36.6 °C) (Oral) 18 5' 6\" (1.676 m) 153 lb 9.6 oz (69.7 kg) LMP SpO2 BMI OB Status Smoking Status (LMP Unknown) 97% 24.79 kg/m2 Postmenopausal Never Smoker Vitals History BMI and BSA Data Body Mass Index Body Surface Area 24.79 kg/m 2 1.8 m 2 Preferred Pharmacy Pharmacy Name Phone [facility] [phone] Your Updated Medication List  
  
   
This list is accurate as of: 6/22/17 10:31 AM.  Always use your most recent med list.  
  
  
  
  
 doxycycline 100 mg capsule Commonly known as:  Ramirez Ivans Take 1 Cap by mouth two (2) times a day for 10 days. FLUoxetine 40 mg capsule Commonly known as:  PROzac Take 1 Cap by mouth daily. hydroCHLOROthiazide 25 mg tablet Commonly known as:  HYDRODIURIL  
TAKE ONE-HALF TABLET BY MOUTH EVERY DAY FOR FLUID  
  
 meloxicam 15 mg tablet Commonly known as:  MOBIC Take 1 Tab by mouth daily. For back pain NexIUM 20 mg capsule Generic drug:  esomeprazole Take  by mouth daily. ondansetron 4 mg disintegrating tablet Commonly known as:  ZOFRAN ODT Take 1 Tab by mouth every eight (8) hours as needed for Nausea. VALTREX 1 gram tablet Generic drug:  valACYclovir Take  by mouth. Prescriptions Sent to Pharmacy Refills  
 doxycycline (MONODOX) 100 mg capsule 0 Sig: Take 1 Cap by mouth two (2) times a day for 10 days. Class: Normal  
 Pharmacy: [address] #: [phone] Route: Oral  
  
We Performed the Following AMB POC URINALYSIS DIP STICK AUTO W/O MICRO [79590 CPT(R)] CT/NG/T.VAGINALIS AMPLIFICATION [id number] CPT(R)] CULTURE, URINE [id number] CPT(R)] Patient Instructions Urethritis: Care Instructions Your Care Instructions Urethritis is an infection of the tube that takes urine from the bladder to the outside of the body. This tube is called the urethra. The infection is often caused by bacteria. This can happen if you have a sexually transmitted infection (STI). But a virus may also be a cause. Urethritis is usually treated with antibiotics. Most cases clear up with treatment. Proper treatment is very important. If you don't treat it, the infection can lead to lasting damage of the urethra. Other parts of the urinary system can also be damaged. Follow-up care is a key part of your treatment and safety. Be sure to make and go to all appointments, and call your doctor if you are having problems. It's also a good idea to know your test results and keep a list of the medicines you take. How can you care for yourself at home? · If your doctor prescribed antibiotics, take them as directed.  Do not stop taking them just because you feel better. You need to take the full course of antibiotics. · Take an over-the-counter pain medicine, such as acetaminophen (Tylenol), ibuprofen (Advil, Motrin), or naproxen (Aleve), if needed. Be safe with medicines. Read and follow all instructions on the label. · Do not take two or more pain medicines at the same time unless the doctor told you to. Many pain medicines have acetaminophen, which is Tylenol. Too much acetaminophen (Tylenol) can be harmful. · Your doctor may have you take phenazopyridine (Pyridium). This is a pain medicine for the urinary tract. It can turn your urine a deep red-orange. This is normal. Call your doctor if you think you are having a problem with your medicine. · Do not have sex until you are done with treatment. If you do have sex, be sure to use a condom. Your sex partner or partners should be tested too if your urethritis was caused by an STI. · If your infection was caused by an injury or chemicals, avoid those things if you can. When should you call for help? Call your doctor now or seek immediate medical care if: 
· You can't urinate. · You have symptoms of a urinary infection. For example: ¨ You have blood or pus in your urine. ¨ You have pain in your back just below your rib cage. This is called flank pain. ¨ You have a fever, chills, or body aches. ¨ It hurts to urinate. ¨ You have groin or belly pain. · You have a hard time urinating when your bladder is full. · You notice mental changes or feel confused. Watch closely for changes in your health, and be sure to contact your doctor if: 
· You do not get better as expected. Where can you learn more? Go to http://phyllis-harsh.info/. Enter H788 in the search box to learn more about \"Urethritis: Care Instructions. \" Current as of: August 12, 2016 Content Version: 11.3 © 5508-6329 [employer].  Care instructions adapted under license by [address] (which disclaims liability or warranty for this information). If you have questions about a medical condition or this instruction, always ask your healthcare professional. [address] any warranty or liability for your use of this information. Introducing [facility]! [employer] introduces [employer] patient portal. Now you can access parts of your medical record, email your doctor's office, and request medication refills online. 1. In your internet browser, go to [email or web address] 2. Click on the First Time User? Click Here link in the Sign In box. You will see the New Member Sign Up page. 3. Enter your [employer] Access Code exactly as it appears below. You will not need to use this code after youve completed the sign-up process. If you do not sign up before the expiration date, you must request a new code. · [employer] Access Code: [email or web address] Expires: 9/20/2017 10:31 AM 
 
4. Enter the last four digits of your Social Security Number (xxxx) and Date of Birth (mm/dd/yyyy) as indicated and click Submit. You will be taken to the next sign-up page. 5. Create a Invuity ID. This will be your Inv[employer] login ID and cannot be changed, so think of one that is secure and easy to remember. 6. Create a [employer] password. You can change your password at any time. 7. Enter your Password Reset Question and Answer. This can be used at a later time if you forget your password. 8. Enter your e-mail address. You will receive e-mail notification when new information is available in [address] 9. Click Sign Up. You can now view and download portions of your medical record. 10. Click the Download Summary menu link to download a portable copy of your medical information. If you have questions, please visit the Frequently Asked Questions section of the [employer] website.  Remember, [employer] is NOT to be used for urgent needs. For medical emergencies, dial 911. Now available from your iPhone and Android! Please provide this summary of care documentation to your next provider. Your primary care clinician is listed as [address] If you have any questions after today's visit, please call [phone].

## 2017-06-23 LAB — BACTERIA UR CULT: NO GROWTH

## 2017-06-26 ENCOUNTER — TELEPHONE (OUTPATIENT)
Dept: FAMILY MEDICINE CLINIC | Age: 57
End: 2017-06-26

## 2017-06-26 LAB
C TRACH RRNA SPEC QL NAA+PROBE: NEGATIVE
N GONORRHOEA RRNA SPEC QL NAA+PROBE: NEGATIVE
T VAGINALIS RRNA SPEC QL NAA+PROBE: NEGATIVE

## 2017-06-26 NOTE — PROGRESS NOTES
Can you call and ask where the STD urine swab is? Urine Culture was negative but have not received those results.    Thanks   Donnie MEDINA

## 2017-06-27 ENCOUNTER — TELEPHONE (OUTPATIENT)
Dept: FAMILY MEDICINE CLINIC | Age: 57
End: 2017-06-27

## 2017-06-27 DIAGNOSIS — R10.2 SUPRAPUBIC DISCOMFORT: ICD-10-CM

## 2017-06-27 DIAGNOSIS — R35.0 URINARY FREQUENCY: Primary | ICD-10-CM

## 2017-06-27 NOTE — PROGRESS NOTES
I was waiting for the rest of the urine to result before calling her. Please let her know that her Urine culture and her STD check were both negative. If she is feeling better on the antibiotic, then she should complete the entire dose. If she is not feeling any better then consider referral to urology.    Thanks  Lamar Simpson [facility]

## 2017-06-27 NOTE — TELEPHONE ENCOUNTER
----- Message from Xu Bee NP sent at 6/27/2017  8:07 AM EDT -----  I was waiting for the rest of the urine to result before calling her. Please let her know that her Urine culture and her STD check were both negative. If she is feeling better on the antibiotic, then she should complete the entire dose. If she is not feeling any better then consider referral to urology.    Thanks  Radha Brito [facility]

## 2017-06-27 NOTE — TELEPHONE ENCOUNTER
Pt notified and verbalized understanding. Pt states that she is still having symptoms and would like to be seen by urology. Appt scheduled today 6-27-17 at 3:20. Pt needs to arrive at 3:00 to register. [facility] [address]. Records faxed to [phone].

## 2017-06-29 ENCOUNTER — HOSPITAL ENCOUNTER (OUTPATIENT)
Dept: MRI IMAGING | Age: 57
Discharge: HOME | End: 2017-06-29
Attending: OBSTETRICS & GYNECOLOGY
Payer: COMMERCIAL

## 2017-06-29 DIAGNOSIS — N89.8 VAGINAL CYSTS: ICD-10-CM

## 2017-06-29 PROCEDURE — 74011250636 HC RX REV CODE- 250/636: Performed by: OBSTETRICS & GYNECOLOGY

## 2017-06-29 PROCEDURE — 72197 MRI PELVIS W/O & W/DYE: CPT

## 2017-06-29 PROCEDURE — A9576 INJ PROHANCE MULTIPACK: HCPCS | Performed by: OBSTETRICS & GYNECOLOGY

## 2017-06-29 RX ADMIN — GADOTERIDOL 14 ML: 279.3 INJECTION, SOLUTION INTRAVENOUS at 18:13

## 2017-09-27 ENCOUNTER — OFFICE VISIT (OUTPATIENT)
Dept: FAMILY MEDICINE CLINIC | Age: 57
End: 2017-09-27

## 2017-09-27 VITALS
WEIGHT: 153 LBS | DIASTOLIC BLOOD PRESSURE: 80 MMHG | SYSTOLIC BLOOD PRESSURE: 142 MMHG | HEART RATE: 80 BPM | OXYGEN SATURATION: 96 % | HEIGHT: 66 IN | RESPIRATION RATE: 12 BRPM | BODY MASS INDEX: 24.59 KG/M2

## 2017-09-27 DIAGNOSIS — Z11.59 NEED FOR HEPATITIS C SCREENING TEST: ICD-10-CM

## 2017-09-27 DIAGNOSIS — Z00.00 WELL ADULT EXAM: Primary | ICD-10-CM

## 2017-09-27 DIAGNOSIS — F41.1 GAD (GENERALIZED ANXIETY DISORDER): ICD-10-CM

## 2017-09-27 DIAGNOSIS — Z23 ENCOUNTER FOR IMMUNIZATION: ICD-10-CM

## 2017-09-27 DIAGNOSIS — I10 ESSENTIAL HYPERTENSION: ICD-10-CM

## 2017-09-27 DIAGNOSIS — Z12.11 COLON CANCER SCREENING: ICD-10-CM

## 2017-09-27 RX ORDER — HYDROCHLOROTHIAZIDE 25 MG/1
25 TABLET ORAL DAILY
Qty: 90 TAB | Refills: 3 | Status: SHIPPED | OUTPATIENT
Start: 2017-09-27 | End: 2018-09-21 | Stop reason: SDUPTHER

## 2017-09-27 NOTE — PROGRESS NOTES
Tdap and Influenza Immunizations administered 9/27/2017 by Lisa Ordaz with patients consent. Patient tolerated procedure well. No reactions noted. VIS given.

## 2017-09-27 NOTE — PROGRESS NOTES
RUDY  Tani Jauregui is a 62 y.o. female who presents to follow-up on anxiety and hypertension. Took the opportunity to do a wellness visit. We had increased her dose of Prozac to 40 mg back in March. She felt like this gave her good relief and felt like her anxiety was under good control for several months. About the time school started up again she started noticing that she was having more episodes of anxiety during the day. Particular in the morning when she was getting ready and at night when she was relaxing. Tells me that school is stressful, has 2 teenagers with autism and getting them ready for school and helping out with after school activities is a stressor although I had to pry in order to find this out. She initially volunteered that she was under her typical level of stress. Has not established with a counselor yet. Does not check her blood pressure at home, has noticed less to doctor's visits she has been to outside of the system that her blood pressure has been elevated. She had an MRI done of the pelvic area and had earplugs for the noise but has noticed that ever since the MRI she has had a ringing or ear. Has not tried anything for this. PMHx:  Past Medical History:   Diagnosis Date    Depression 2/26/2010    Hypertension 2/26/2010       Meds:   Current Outpatient Prescriptions   Medication Sig Dispense Refill    hydroCHLOROthiazide (HYDRODIURIL) 25 mg tablet Take 1 Tab by mouth daily. 90 Tab 3    FLUoxetine (PROZAC) 40 mg capsule Take 1 Cap by mouth daily. 90 Cap 3    valACYclovir (VALTREX) 1 gram tablet Take  by mouth.  esomeprazole (NEXIUM) 20 mg capsule Take  by mouth daily.  ondansetron (ZOFRAN ODT) 4 mg disintegrating tablet Take 1 Tab by mouth every eight (8) hours as needed for Nausea. 20 Tab 0    meloxicam (MOBIC) 15 mg tablet Take 1 Tab by mouth daily.  For back pain 30 Tab 2       Allergies:   No Known Allergies    Smoker:  History   Smoking Status    Never Smoker   Smokeless Tobacco    Never Used       ETOH:   History   Alcohol Use No       FH:   Family History   Problem Relation Age of Onset    Heart Disease Brother      cancer       ROS:   As listed in HPI. In addition:  Constitutional:   No headache, fever, fatigue, weight loss or weight gain      Cardiac:    No chest pain      Resp:   No cough or shortness of breath      Neuro   No loss of consciousness, dizziness, seizures      Physical Exam:  Blood pressure 146/83, pulse 80, resp. rate 12, height 5' 6\" (1.676 m), weight 153 lb (69.4 kg), SpO2 96 %. GEN: No apparent distress. Alert and oriented and responds to all questions appropriately. NEUROLOGIC:  No focal neurologic deficits. Strength and sensation grossly intact. Coordination and gait grossly intact. EXT: Well perfused. No edema. SKIN: No obvious rashes. Lungs clear to auscultation bilaterally  CV regular rate and rhythm no murmur  HEENT, clear tympanic membranes, clear nares, clear oropharynx       Assessment and Plan     Well adult  Due for Pap smear with Dr. Key Dean at [facility] in a few months  Flu shot today  Tdap today  Discussed colonoscopy versus fit kit. She would like to fit kit    Been several years since we got labs, surveillance labs today including TSH for anxiety    Hypertension  Increase HCTZ 25 mg (was taking 12.5 mg)    Tinnitus  Expect to take several months for resolution. No obvious hearing loss, recommend white noise generator for bedtime if she is bothered by it at night    Labs reviewed  ACS risk 6.8%, will probably need to consider cholesterol medication by the time that she is 60 if nothing else changes. ICD-10-CM ICD-9-CM    1. Well adult exam Z00.00 V70.0    2. MARIA VICTORIA (generalized anxiety disorder) F41.1 300.02    3.  Essential hypertension I10 401.9 TSH 3RD GENERATION      METABOLIC PANEL, COMPREHENSIVE      CBC WITH AUTOMATED DIFF      LIPID PANEL   4. Encounter for immunization Z23 V03.89 TETANUS, DIPHTHERIA TOXOIDS AND ACELLULAR PERTUSSIS VACCINE (TDAP), IN INDIVIDS. >=7, IM      INFLUENZA VIRUS VAC QUAD,SPLIT,PRESV FREE SYRINGE IM      ND IMMUNIZ ADMIN,1 SINGLE/COMB VAC/TOXOID      ND IMMUNIZ,ADMIN,EACH ADDL   5. Need for hepatitis C screening test Z11.59 V73.89 HCV AB W/RFLX TO SESAR   6. Colon cancer screening Z12.11 V76.51 OCCULT BLOOD, IMMUNOASSAY (FIT)       AVS given.  Pt expressed understanding of instructions

## 2017-09-27 NOTE — MR AVS SNAPSHOT
Visit Information Date & Time Provider Department Dept. Phone Encounter #  
 9/27/2017  3:00 PM May MD Erika [address] [facility] 907 [phone] [id number] Upcoming Health Maintenance Date Due Hepatitis C Screening 1960 DTaP/Tdap/Td series (1 - Tdap) 5/6/1981 FOBT Q 1 YEAR AGE 50-75 5/6/2010 INFLUENZA AGE 9 TO ADULT 8/1/2017 PAP AKA CERVICAL CYTOLOGY 12/10/2017 BREAST CANCER SCRN MAMMOGRAM 1/16/2019 Allergies as of 9/27/2017  Review Complete On: 9/27/2017 By: Olena Helm No Known Allergies Current Immunizations  Reviewed on 11/7/2016 Name Date Influenza Vaccine 10/14/2015 Influenza Vaccine (Quad) PF  Incomplete, 11/7/2016 Tdap  Incomplete Not reviewed this visit You Were Diagnosed With   
  
 Codes Comments Well adult exam    -  Primary ICD-10-CM: Z00.00 ICD-9-CM: V70.0   
 MARIA VICTORIA (generalized anxiety disorder)     ICD-10-CM: F41.1 ICD-9-CM: 300.02 Essential hypertension     ICD-10-CM: I10 
ICD-9-CM: 401.9 Encounter for immunization     ICD-10-CM: S96 ICD-9-CM: V03.89 Need for hepatitis C screening test     ICD-10-CM: Z11.59 
ICD-9-CM: V73.89 Colon cancer screening     ICD-10-CM: Z12.11 ICD-9-CM: V76.51 Vitals BP Pulse Resp Height(growth percentile) Weight(growth percentile) LMP  
 146/83 (BP 1 Location: Left arm, BP Patient Position: Sitting) 80 12 5' 6\" (1.676 m) 153 lb (69.4 kg) (LMP Unknown) SpO2 BMI OB Status Smoking Status 96% 24.69 kg/m2 Postmenopausal Never Smoker Vitals History BMI and BSA Data Body Mass Index Body Surface Area  
 24.69 kg/m 2 1.8 m 2 Preferred Pharmacy Pharmacy Name Phone [facility] [address] [phone] Your Updated Medication List  
  
   
This list is accurate as of: 9/27/17  3:53 PM.  Always use your most recent med list.  
  
  
  
  
 FLUoxetine 40 mg capsule Commonly known as:  PROzac Take 1 Cap by mouth daily. hydroCHLOROthiazide 25 mg tablet Commonly known as:  HYDRODIURIL Take 1 Tab by mouth daily. meloxicam 15 mg tablet Commonly known as:  MOBIC Take 1 Tab by mouth daily. For back pain NexIUM 20 mg capsule Generic drug:  esomeprazole Take  by mouth daily. ondansetron 4 mg disintegrating tablet Commonly known as:  ZOFRAN ODT Take 1 Tab by mouth every eight (8) hours as needed for Nausea. VALTREX 1 gram tablet Generic drug:  valACYclovir Take  by mouth. Prescriptions Sent to Pharmacy Refills  
 hydroCHLOROthiazide (HYDRODIURIL) 25 mg tablet 3 Sig: Take 1 Tab by mouth daily. Class: Normal  
 Pharmacy: [address] #: [phone] Route: Oral  
  
We Performed the Following CBC WITH AUTOMATED DIFF [70307 CPT(R)] HCV AB W/RFLX TO ESSAR [06415 CPT(R)] INFLUENZA VIRUS VAC QUAD,SPLIT,PRESV FREE SYRINGE IM [id number] CPT(R)] LIPID PANEL [83400 CPT(R)] METABOLIC PANEL, COMPREHENSIVE [34619 CPT(R)] OCCULT BLOOD, IMMUNOASSAY (FIT) [id number] CPT(R)] MT IMMUNIZ ADMIN,1 SINGLE/COMB VAC/TOXOID [id number] CPT(R)] MT IMMUNIZ,ADMIN,EACH ADDL [id number] CPT(R)] TETANUS, DIPHTHERIA TOXOIDS AND ACELLULAR PERTUSSIS VACCINE (TDAP), IN INDIVIDS. >=7, IM [id number] CPT(R)] TSH 3RD GENERATION [17900 CPT(R)] Introducing [facility]! Evangelina Shabazz introduces [employer] patient portal. Now you can access parts of your medical record, email your doctor's office, and request medication refills online. 1. In your internet browser, go to [email or web address] 2. Click on the First Time User? Click Here link in the Sign In box. You will see the New Member Sign Up page. 3. Enter your [employer] Access Code exactly as it appears below. You will not need to use this code after youve completed the sign-up process.  If you do not sign up before the expiration date, you must request a new code. · [employer] Access Code: [email or web address] Expires: 12/26/2017  2:56 PM 
 
4. Enter the last four digits of your Social Security Number (xxxx) and Date of Birth (mm/dd/yyyy) as indicated and click Submit. You will be taken to the next sign-up page. 5. Create a [employer] ID. This will be your [employer] login ID and cannot be changed, so think of one that is secure and easy to remember. 6. Create a [employer] password. You can change your password at any time. 7. Enter your Password Reset Question and Answer. This can be used at a later time if you forget your password. 8. Enter your e-mail address. You will receive e-mail notification when new information is available in [address] 9. Click Sign Up. You can now view and download portions of your medical record. 10. Click the Download Summary menu link to download a portable copy of your medical information. If you have questions, please visit the Frequently Asked Questions section of the [employer] website. Remember, [employer] is NOT to be used for urgent needs. For medical emergencies, dial 911. Now available from your iPhone and Android! Please provide this summary of care documentation to your next provider. Your primary care clinician is listed as Vikas Lux. If you have any questions after today's visit, please call [phone].

## 2017-10-03 LAB
ALBUMIN SERPL-MCNC: 4.2 G/DL (ref 3.5–5.5)
ALBUMIN/GLOB SERPL: 1.6 {RATIO} (ref 1.2–2.2)
ALP SERPL-CCNC: 85 IU/L (ref 39–117)
ALT SERPL-CCNC: 25 IU/L (ref 0–32)
AST SERPL-CCNC: 17 IU/L (ref 0–40)
BASOPHILS # BLD AUTO: 0 X10E3/UL (ref 0–0.2)
BASOPHILS NFR BLD AUTO: 0 %
BILIRUB SERPL-MCNC: 0.3 MG/DL (ref 0–1.2)
BUN SERPL-MCNC: 19 MG/DL (ref 6–24)
BUN/CREAT SERPL: 25 (ref 9–23)
CALCIUM SERPL-MCNC: 9.3 MG/DL (ref 8.7–10.2)
CHLORIDE SERPL-SCNC: 101 MMOL/L (ref 96–106)
CHOLEST SERPL-MCNC: 282 MG/DL (ref 100–199)
CO2 SERPL-SCNC: 28 MMOL/L (ref 18–29)
CREAT SERPL-MCNC: 0.77 MG/DL (ref 0.57–1)
EOSINOPHIL # BLD AUTO: 0.1 X10E3/UL (ref 0–0.4)
EOSINOPHIL NFR BLD AUTO: 1 %
ERYTHROCYTE [DISTWIDTH] IN BLOOD BY AUTOMATED COUNT: 14.1 % (ref 12.3–15.4)
GLOBULIN SER CALC-MCNC: 2.7 G/DL (ref 1.5–4.5)
GLUCOSE SERPL-MCNC: 144 MG/DL (ref 65–99)
HCT VFR BLD AUTO: 42.2 % (ref 34–46.6)
HCV AB S/CO SERPL IA: <0.1 S/CO RATIO (ref 0–0.9)
HCV AB SERPL QL IA: NORMAL
HDLC SERPL-MCNC: 43 MG/DL
HEMOCCULT STL QL IA: NORMAL
HGB BLD-MCNC: 14.3 G/DL (ref 11.1–15.9)
IMM GRANULOCYTES # BLD: 0 X10E3/UL (ref 0–0.1)
IMM GRANULOCYTES NFR BLD: 0 %
INTERPRETATION, 910389: NORMAL
LDLC SERPL CALC-MCNC: 168 MG/DL (ref 0–99)
LYMPHOCYTES # BLD AUTO: 2.7 X10E3/UL (ref 0.7–3.1)
LYMPHOCYTES NFR BLD AUTO: 36 %
MCH RBC QN AUTO: 28.6 PG (ref 26.6–33)
MCHC RBC AUTO-ENTMCNC: 33.9 G/DL (ref 31.5–35.7)
MCV RBC AUTO: 84 FL (ref 79–97)
MONOCYTES # BLD AUTO: 0.6 X10E3/UL (ref 0.1–0.9)
MONOCYTES NFR BLD AUTO: 8 %
NEUTROPHILS # BLD AUTO: 4.1 X10E3/UL (ref 1.4–7)
NEUTROPHILS NFR BLD AUTO: 55 %
PLATELET # BLD AUTO: 264 X10E3/UL (ref 150–379)
POTASSIUM SERPL-SCNC: 3.8 MMOL/L (ref 3.5–5.2)
PROT SERPL-MCNC: 6.9 G/DL (ref 6–8.5)
RBC # BLD AUTO: 5 X10E6/UL (ref 3.77–5.28)
SODIUM SERPL-SCNC: 142 MMOL/L (ref 134–144)
TRIGL SERPL-MCNC: 355 MG/DL (ref 0–149)
TSH SERPL DL<=0.005 MIU/L-ACNC: 2.3 UIU/ML (ref 0.45–4.5)
VLDLC SERPL CALC-MCNC: 71 MG/DL (ref 5–40)
WBC # BLD AUTO: 7.4 X10E3/UL (ref 3.4–10.8)

## 2017-10-19 ENCOUNTER — TELEPHONE (OUTPATIENT)
Dept: FAMILY MEDICINE CLINIC | Age: 57
End: 2017-10-19

## 2017-10-19 NOTE — TELEPHONE ENCOUNTER
Notified by patient that she is having some intermittent muscle aches in the back since increasing the dose of HCTZ to 25 mg a few weeks ago. Might be worth checking  CMP.   Arrange lab visit

## 2017-10-20 ENCOUNTER — LAB ONLY (OUTPATIENT)
Dept: FAMILY MEDICINE CLINIC | Age: 57
End: 2017-10-20

## 2017-10-20 DIAGNOSIS — I10 ESSENTIAL HYPERTENSION: Primary | ICD-10-CM

## 2017-10-21 LAB
ALBUMIN SERPL-MCNC: 4.2 G/DL (ref 3.5–5.5)
ALBUMIN/GLOB SERPL: 1.7 {RATIO} (ref 1.2–2.2)
ALP SERPL-CCNC: 82 IU/L (ref 39–117)
ALT SERPL-CCNC: 20 IU/L (ref 0–32)
AST SERPL-CCNC: 17 IU/L (ref 0–40)
BILIRUB SERPL-MCNC: 0.2 MG/DL (ref 0–1.2)
BUN SERPL-MCNC: 22 MG/DL (ref 6–24)
BUN/CREAT SERPL: 32 (ref 9–23)
CALCIUM SERPL-MCNC: 9.4 MG/DL (ref 8.7–10.2)
CHLORIDE SERPL-SCNC: 98 MMOL/L (ref 96–106)
CO2 SERPL-SCNC: 26 MMOL/L (ref 18–29)
CREAT SERPL-MCNC: 0.69 MG/DL (ref 0.57–1)
GFR SERPLBLD CREATININE-BSD FMLA CKD-EPI: 112 ML/MIN/1.73
GFR SERPLBLD CREATININE-BSD FMLA CKD-EPI: 97 ML/MIN/1.73
GLOBULIN SER CALC-MCNC: 2.5 G/DL (ref 1.5–4.5)
GLUCOSE SERPL-MCNC: 123 MG/DL (ref 65–99)
POTASSIUM SERPL-SCNC: 3.7 MMOL/L (ref 3.5–5.2)
PROT SERPL-MCNC: 6.7 G/DL (ref 6–8.5)
SODIUM SERPL-SCNC: 141 MMOL/L (ref 134–144)

## 2017-10-23 ENCOUNTER — TELEPHONE (OUTPATIENT)
Dept: FAMILY MEDICINE CLINIC | Age: 57
End: 2017-10-23

## 2018-03-29 ENCOUNTER — OFFICE VISIT (OUTPATIENT)
Dept: FAMILY MEDICINE CLINIC | Age: 58
End: 2018-03-29

## 2018-03-29 VITALS
DIASTOLIC BLOOD PRESSURE: 70 MMHG | OXYGEN SATURATION: 98 % | WEIGHT: 155 LBS | SYSTOLIC BLOOD PRESSURE: 127 MMHG | HEART RATE: 76 BPM | RESPIRATION RATE: 14 BRPM | TEMPERATURE: 98 F | BODY MASS INDEX: 24.91 KG/M2 | HEIGHT: 66 IN

## 2018-03-29 DIAGNOSIS — M75.102 ROTATOR CUFF SYNDROME OF LEFT SHOULDER: ICD-10-CM

## 2018-03-29 DIAGNOSIS — I10 ESSENTIAL HYPERTENSION: Primary | ICD-10-CM

## 2018-03-29 DIAGNOSIS — F41.1 GAD (GENERALIZED ANXIETY DISORDER): ICD-10-CM

## 2018-03-29 RX ORDER — FLUOXETINE HYDROCHLORIDE 40 MG/1
40 CAPSULE ORAL DAILY
Qty: 90 CAP | Refills: 3 | Status: SHIPPED | OUTPATIENT
Start: 2018-03-29 | End: 2019-03-06 | Stop reason: SDUPTHER

## 2018-03-29 NOTE — MR AVS SNAPSHOT
[address] [facility] 
 
 
 [address] 
[phone] Patient: William Castaneda MRN:  [id number] Visit Information Date & Time Provider Department Dept. Phone Encounter #  
 3/29/2018 10:00 AM Chucho Capps MD [address] [facility] [address] [facility] [id number] Upcoming Health Maintenance Date Due  
 PAP AKA CERVICAL CYTOLOGY 12/10/2017 FOBT Q 1 YEAR AGE 50-75 9/27/2018 BREAST CANCER SCRN MAMMOGRAM 1/16/2019 DTaP/Tdap/Td series (2 - Td) 9/27/2027 Allergies as of 3/29/2018  Review Complete On: 3/29/2018 By: Chucho Capps MD  
 No Known Allergies Current Immunizations  Reviewed on 9/27/2017 Name Date Influenza Vaccine 10/14/2015 Influenza Vaccine (Quad) PF 9/27/2017, 11/7/2016 Tdap 9/27/2017 Not reviewed this visit You Were Diagnosed With   
  
 Codes Comments Essential hypertension    -  Primary ICD-10-CM: I10 
ICD-9-CM: 401.9 MARIA VICTORIA (generalized anxiety disorder)     ICD-10-CM: F41.1 ICD-9-CM: 300.02 Rotator cuff syndrome of left shoulder     ICD-10-CM: M75.102 ICD-9-CM: 726.10 Vitals BP Pulse Temp Resp Height(growth percentile) Weight(growth percentile) 127/70 76 98 °F (36.7 °C) 14 5' 6\" (1.676 m) 155 lb (70.3 kg) LMP SpO2 BMI OB Status Smoking Status (LMP Unknown) 98% 25.02 kg/m2 Postmenopausal Never Smoker Vitals History BMI and BSA Data Body Mass Index Body Surface Area 25.02 kg/m 2 1.81 m 2 Preferred Pharmacy Pharmacy Name Phone [facility] [phone] Your Updated Medication List  
  
   
This list is accurate as of 3/29/18 10:39 AM.  Always use your most recent med list.  
  
  
  
  
 FLUoxetine 40 mg capsule Commonly known as:  PROzac Take 1 Cap by mouth daily. hydroCHLOROthiazide 25 mg tablet Commonly known as:  HYDRODIURIL  
 Take 1 Tab by mouth daily. NexIUM 20 mg capsule Generic drug:  esomeprazole Take  by mouth daily. VALTREX 1 gram tablet Generic drug:  valACYclovir Take  by mouth. Prescriptions Sent to Pharmacy Refills FLUoxetine (PROZAC) 40 mg capsule 3 Sig: Take 1 Cap by mouth daily. Class: Normal  
 Pharmacy: [facility], [address] [facility] #: [phone] Route: Oral  
  
Patient Instructions Rotator Cuff: Exercises Your Care Instructions Here are some examples of typical rehabilitation exercises for your condition. Start each exercise slowly. Ease off the exercise if you start to have pain. Your doctor or physical therapist will tell you when you can start these exercises and which ones will work best for you. How to do the exercises Pendulum swing If you have pain in your back, do not do this exercise. 1. Hold on to a table or the back of a chair with your good arm. Then bend forward a little and let your sore arm hang straight down. This exercise does not use the arm muscles. Rather, use your legs and your hips to create movement that makes your arm swing freely. 2. Use the movement from your hips and legs to guide the slightly swinging arm back and forth like a pendulum (or elephant trunk). Then guide it in circles that start small (about the size of a dinner plate). Make the circles a bit larger each day, as your pain allows. 3. Do this exercise for 5 minutes, 5 to 7 times each day. 4. As you have less pain, try bending over a little farther to do this exercise. This will increase the amount of movement at your shoulder. Posterior stretching exercise 1. Hold the elbow of your injured arm with your other hand. 2. Use your hand to pull your injured arm gently up and across your body. You will feel a gentle stretch across the back of your injured shoulder. 3. Hold for at least 15 to 30 seconds. Then slowly lower your arm. 4. Repeat 2 to 4 times. Up-the-back stretch Your doctor or physical therapist may want you to wait to do this stretch until you have regained most of your range of motion and strength. You can do this stretch in different ways. Hold any of these stretches for at least 15 to 30 seconds. Repeat them 2 to 4 times. 1. Put your hand in your back pocket. Let it rest there to stretch your shoulder. 2. With your other hand, hold your injured arm (palm outward) behind your back by the wrist. Pull your arm up gently to stretch your shoulder. 3. Next, put a towel over your other shoulder. Put the hand of your injured arm behind your back. Now hold the back end of the towel. With the other hand, hold the front end of the towel in front of your body. Pull gently on the front end of the towel. This will bring your hand farther up your back to stretch your shoulder. Overhead stretch 1. Standing about an arm's length away, grasp onto a solid surface. You could use a countertop, a doorknob, or the back of a sturdy chair. 2. With your knees slightly bent, bend forward with your arms straight. Lower your upper body, and let your shoulders stretch. 3. As your shoulders are able to stretch farther, you may need to take a step or two backward. 4. Hold for at least 15 to 30 seconds. Then stand up and relax. If you had stepped back during your stretch, step forward so you can keep your hands on the solid surface. 5. Repeat 2 to 4 times. Shoulder flexion (lying down) To make a wand for this exercise, use a piece of PVC pipe or a broom handle with the broom removed. Make the wand about a foot wider than your shoulders. 1. Lie on your back, holding a wand with both hands. Your palms should face down as you hold the wand. 2. Keeping your elbows straight, slowly raise your arms over your head. Raise them until you feel a stretch in your shoulders, upper back, and chest. 
3. Hold for 15 to 30 seconds. 4. Repeat 2 to 4 times. Shoulder rotation (lying down) To make a wand for this exercise, use a piece of PVC pipe or a broom handle with the broom removed. Make the wand about a foot wider than your shoulders. 1. Lie on your back. Hold a wand with both hands with your elbows bent and palms up. 2. Keep your elbows close to your body, and move the wand across your body toward the sore arm. 3. Hold for 8 to 12 seconds. 4. Repeat 2 to 4 times. Wall climbing (to the side) Avoid any movement that is straight to your side, and be careful not to arch your back. Your arm should stay about 30 degrees to the front of your side. 1. Stand with your side to a wall so that your fingers can just touch it at an angle about 30 degrees toward the front of your body. 2. Walk the fingers of your injured arm up the wall as high as pain permits. Try not to shrug your shoulder up toward your ear as you move your arm up. 3. Hold that position for a count of at least 15 to 20. 
4. Walk your fingers back down to the starting position. 5. Repeat at least 2 to 4 times. Try to reach higher each time. Wall climbing (to the front) During this stretching exercise, be careful not to arch your back. 1. Face a wall, and stand so your fingers can just touch it. 2. Keeping your shoulder down, walk the fingers of your injured arm up the wall as high as pain permits. (Don't shrug your shoulder up toward your ear.) 3. Hold your arm in that position for at least 15 to 30 seconds. 4. Slowly walk your fingers back down to where you started. 5. Repeat at least 2 to 4 times. Try to reach higher each time. Shoulder blade squeeze 1. Stand with your arms at your sides, and squeeze your shoulder blades together. Do not raise your shoulders up as you squeeze. 2. Hold 6 seconds. 3. Repeat 8 to 12 times. Scapular exercise: Arm reach 1. Lie flat on your back. This exercise is a very slight motion that starts with your arms raised (elbows straight, arms straight). 2. From this position, reach higher toward the varsha or ceiling. Keep your elbows straight. All motion should be from your shoulder blade only. 3. Relax your arms back to where you started. 4. Repeat 8 to 12 times. Arm raise to the side During this strengthening exercise, your arm should stay about 30 degrees to the front of your side. 1. Slowly raise your injured arm to the side, with your thumb facing up. Raise your arm 60 degrees at the most (shoulder level is 90 degrees). 2. Hold the position for 3 to 5 seconds. Then lower your arm back to your side. If you need to, bring your \"good\" arm across your body and place it under the elbow as you lower your injured arm. Use your good arm to keep your injured arm from dropping down too fast. 
3. Repeat 8 to 12 times. 4. When you first start out, don't hold any extra weight in your hand. As you get stronger, you may use a 1-pound to 2-pound dumbbell or a small can of food. Shoulder flexor and extensor exercise These are isometric exercises. That means you contract your muscles without actually moving. 1. Push forward (flex): Stand facing a wall or doorjamb, about 6 inches or less back. Hold your injured arm against your body. Make a closed fist with your thumb on top. Then gently push your hand forward into the wall with about 25% to 50% of your strength. Don't let your body move backward as you push. Hold for about 6 seconds. Relax for a few seconds. Repeat 8 to 12 times. 2. Push backward (extend): Stand with your back flat against a wall. Your upper arm should be against the wall, with your elbow bent 90 degrees (your hand straight ahead). Push your elbow gently back against the wall with about 25% to 50% of your strength.  Don't let your body move forward as you push. Hold for about 6 seconds. Relax for a few seconds. Repeat 8 to 12 times. Scapular exercise: Wall push-ups This exercise is best done with your fingers somewhat turned out, rather than straight up and down. 1. Stand facing a wall, about 12 inches to 18 inches away. 2. Place your hands on the wall at shoulder height. 3. Slowly bend your elbows and bring your face to the wall. Keep your back and hips straight. 4. Push back to where you started. 5. Repeat 8 to 12 times. 6. When you can do this exercise against a wall comfortably, you can try it against a counter. You can then slowly progress to the end of a couch, then to a sturdy chair, and finally to the floor. Scapular exercise: Retraction For this exercise, you will need elastic exercise material, such as surgical tubing or Thera-Band. 1. Put the band around a solid object at about waist level. (A bedpost will work well.) Each hand should hold an end of the band. 2. With your elbows at your sides and bent to 90 degrees, pull the band back. Your shoulder blades should move toward each other. Then move your arms back where you started. 3. Repeat 8 to 12 times. 4. If you have good range of motion in your shoulders, try this exercise with your arms lifted out to the sides. Keep your elbows at a 90-degree angle. Raise the elastic band up to about shoulder level. Pull the band back to move your shoulder blades toward each other. Then move your arms back where you started. Internal rotator strengthening exercise 1. Start by tying a piece of elastic exercise material to a doorknob. You can use surgical tubing or Thera-Band. 2. Stand or sit with your shoulder relaxed and your elbow bent 90 degrees. Your upper arm should rest comfortably against your side. Squeeze a rolled towel between your elbow and your body for comfort. This will help keep your arm at your side. 3. Hold one end of the elastic band in the hand of the painful arm. 4. Slowly rotate your forearm toward your body until it touches your belly. Slowly move it back to where you started. 5. Keep your elbow and upper arm firmly tucked against the towel roll or at your side. 6. Repeat 8 to 12 times. External rotator strengthening exercise 1. Start by tying a piece of elastic exercise material to a doorknob. You can use surgical tubing or Thera-Band. (You may also hold one end of the band in each hand.) 2. Stand or sit with your shoulder relaxed and your elbow bent 90 degrees. Your upper arm should rest comfortably against your side. Squeeze a rolled towel between your elbow and your body for comfort. This will help keep your arm at your side. 3. Hold one end of the elastic band with the hand of the painful arm. 4. Start with your forearm across your belly. Slowly rotate the forearm out away from your body. Keep your elbow and upper arm tucked against the towel roll or the side of your body until you begin to feel tightness in your shoulder. Slowly move your arm back to where you started. 5. Repeat 8 to 12 times. Follow-up care is a key part of your treatment and safety. Be sure to make and go to all appointments, and call your doctor if you are having problems. It's also a good idea to know your test results and keep a list of the medicines you take. Where can you learn more? Go to http://phyllis-harsh.info/. Enter Rosmery Fisher in the search box to learn more about \"Rotator Cuff: Exercises. \" Current as of: March 21, 2017 Content Version: 11.4 © 3517-3042 [employer]. Care instructions adapted under license by [employer] (which disclaims liability or warranty for this information). If you have questions about a medical condition or this instruction, always ask your healthcare professional. [address] any warranty or liability for your use of this information. Introducing [facility]! [employer] introduces [employer] patient portal. Now you can access parts of your medical record, email your doctor's office, and request medication refills online. 1. In your internet browser, go to [email or web address] 2. Click on the First Time User? Click Here link in the Sign In box. You will see the New Member Sign Up page. 3. Enter your [employer] Access Code exactly as it appears below. You will not need to use this code after youve completed the sign-up process. If you do not sign up before the expiration date, you must request a new code. · [employer] Access Code: [email or web address] Expires: 6/27/2018 10:34 AM 
 
4. Enter the last four digits of your Social Security Number (xxxx) and Date of Birth (mm/dd/yyyy) as indicated and click Submit. You will be taken to the next sign-up page. 5. Create a [employer] ID. This will be your [employer] login ID and cannot be changed, so think of one that is secure and easy to remember. 6. Create a [employer] password. You can change your password at any time. 7. Enter your Password Reset Question and Answer. This can be used at a later time if you forget your password. 8. Enter your e-mail address. You will receive e-mail notification when new information is available in [address] 9. Click Sign Up. You can now view and download portions of your medical record. 10. Click the Download Summary menu link to download a portable copy of your medical information. If you have questions, please visit the Frequently Asked Questions section of the [employer] website. Remember, [employer] is NOT to be used for urgent needs. For medical emergencies, dial 911. Now available from your iPhone and Android! Please provide this summary of care documentation to your next provider. Your primary care clinician is listed as Seamus Ortiz. If you have any questions after today's visit, please call [phone].

## 2018-03-29 NOTE — PROGRESS NOTES
HPI  Pete Thomson is a 62 y.o. female who presents follow-up on anxiety and hypertension. She has been feeling more anxious over the last few weeks because of increased workload at work. Has notices increased anxiety feels like a jitteriness, flushing, mind racing. She thinks this is due to external stressors. She has 2 autistic boys. 16years old. They have just started counseling so that they can work on expressing themselves a little bit better. Going to [facility]. Patient herself does not undergo counseling although she thinks might be helpful. She got  in December and that is going well. She is happy with her new . She had elevated blood pressure last time, increased HCTZ to 25 mg. She is not checking her blood pressure at home. He came down on recheck    Her brother recently found out that his cancer is back and there is nothing they can do. She just got that news before coming in here today    PMHx:  Past Medical History:   Diagnosis Date    Depression 2/26/2010    Hypertension 2/26/2010       Meds:   Current Outpatient Prescriptions   Medication Sig Dispense Refill    FLUoxetine (PROZAC) 40 mg capsule Take 1 Cap by mouth daily. 90 Cap 3    hydroCHLOROthiazide (HYDRODIURIL) 25 mg tablet Take 1 Tab by mouth daily. 90 Tab 3    valACYclovir (VALTREX) 1 gram tablet Take  by mouth.  esomeprazole (NEXIUM) 20 mg capsule Take  by mouth daily. Allergies:   No Known Allergies    Smoker:  History   Smoking Status    Never Smoker   Smokeless Tobacco    Never Used       ETOH:   History   Alcohol Use No       FH:   Family History   Problem Relation Age of Onset    Heart Disease Brother      cancer       ROS:   As listed in HPI.  In addition:  Constitutional:   No headache, fever, fatigue, weight loss or weight gain      Cardiac:    No chest pain      Resp:   No cough or shortness of breath      Neuro   No loss of consciousness, dizziness, seizures Physical Exam:  Blood pressure 127/70, pulse 76, temperature 98 °F (36.7 °C), resp. rate 14, height 5' 6\" (1.676 m), weight 155 lb (70.3 kg), SpO2 98 %. GEN: No apparent distress. Alert and oriented and responds to all questions appropriately. NEUROLOGIC:  No focal neurologic deficits. Strength and sensation grossly intact. Coordination and gait grossly intact. EXT: Well perfused. No edema. SKIN: No obvious rashes. Lungs clear to auscultation bilaterally  CV regular rate and rhythm no murmur  HEENT unremarkable  Shoulder examined, left shoulder is the problem. Supraspinatus is full strength but tenderness on resisted range of motion. Positive impingement sign of the left bursa. Full range of motion       Assessment and Plan     Hypertension  HCTZ 25 mg effective    Prozac 40 mg working well for her until the last few weeks been stress increased. She would like to do a dose increase. We were talked about exercise, counseling. I strongly recommend exercise because of the specific symptoms that she is describing. I think that she can easily work these out. Left shoulder rotator cuff syndrome  Provided stretches and exercises    Six-month follow-up, labs were done in September      ICD-10-CM ICD-9-CM    1. Essential hypertension I10 401.9    2. MARIA VICTORIA (generalized anxiety disorder) F41.1 300.02 FLUoxetine (PROZAC) 40 mg capsule   3. Rotator cuff syndrome of left shoulder M75.102 726.10        AVS given.  Pt expressed understanding of instructions

## 2018-03-29 NOTE — PATIENT INSTRUCTIONS
Rotator Cuff: Exercises  Your Care Instructions  Here are some examples of typical rehabilitation exercises for your condition. Start each exercise slowly. Ease off the exercise if you start to have pain. Your doctor or physical therapist will tell you when you can start these exercises and which ones will work best for you. How to do the exercises  Pendulum swing    If you have pain in your back, do not do this exercise. 1. Hold on to a table or the back of a chair with your good arm. Then bend forward a little and let your sore arm hang straight down. This exercise does not use the arm muscles. Rather, use your legs and your hips to create movement that makes your arm swing freely. 2. Use the movement from your hips and legs to guide the slightly swinging arm back and forth like a pendulum (or elephant trunk). Then guide it in circles that start small (about the size of a dinner plate). Make the circles a bit larger each day, as your pain allows. 3. Do this exercise for 5 minutes, 5 to 7 times each day. 4. As you have less pain, try bending over a little farther to do this exercise. This will increase the amount of movement at your shoulder. Posterior stretching exercise    1. Hold the elbow of your injured arm with your other hand. 2. Use your hand to pull your injured arm gently up and across your body. You will feel a gentle stretch across the back of your injured shoulder. 3. Hold for at least 15 to 30 seconds. Then slowly lower your arm. 4. Repeat 2 to 4 times. Up-the-back stretch    Your doctor or physical therapist may want you to wait to do this stretch until you have regained most of your range of motion and strength. You can do this stretch in different ways. Hold any of these stretches for at least 15 to 30 seconds. Repeat them 2 to 4 times. 1. Put your hand in your back pocket. Let it rest there to stretch your shoulder.   2. With your other hand, hold your injured arm (palm outward) behind your back by the wrist. Pull your arm up gently to stretch your shoulder. 3. Next, put a towel over your other shoulder. Put the hand of your injured arm behind your back. Now hold the back end of the towel. With the other hand, hold the front end of the towel in front of your body. Pull gently on the front end of the towel. This will bring your hand farther up your back to stretch your shoulder. Overhead stretch    1. Standing about an arm's length away, grasp onto a solid surface. You could use a countertop, a doorknob, or the back of a sturdy chair. 2. With your knees slightly bent, bend forward with your arms straight. Lower your upper body, and let your shoulders stretch. 3. As your shoulders are able to stretch farther, you may need to take a step or two backward. 4. Hold for at least 15 to 30 seconds. Then stand up and relax. If you had stepped back during your stretch, step forward so you can keep your hands on the solid surface. 5. Repeat 2 to 4 times. Shoulder flexion (lying down)    To make a wand for this exercise, use a piece of PVC pipe or a broom handle with the broom removed. Make the wand about a foot wider than your shoulders. 1. Lie on your back, holding a wand with both hands. Your palms should face down as you hold the wand. 2. Keeping your elbows straight, slowly raise your arms over your head. Raise them until you feel a stretch in your shoulders, upper back, and chest.  3. Hold for 15 to 30 seconds. 4. Repeat 2 to 4 times. Shoulder rotation (lying down)    To make a wand for this exercise, use a piece of PVC pipe or a broom handle with the broom removed. Make the wand about a foot wider than your shoulders. 1. Lie on your back. Hold a wand with both hands with your elbows bent and palms up. 2. Keep your elbows close to your body, and move the wand across your body toward the sore arm. 3. Hold for 8 to 12 seconds. 4. Repeat 2 to 4 times.   Wall climbing (to the side)    Avoid any movement that is straight to your side, and be careful not to arch your back. Your arm should stay about 30 degrees to the front of your side. 1. Stand with your side to a wall so that your fingers can just touch it at an angle about 30 degrees toward the front of your body. 2. Walk the fingers of your injured arm up the wall as high as pain permits. Try not to shrug your shoulder up toward your ear as you move your arm up. 3. Hold that position for a count of at least 15 to 20.  4. Walk your fingers back down to the starting position. 5. Repeat at least 2 to 4 times. Try to reach higher each time. Wall climbing (to the front)    During this stretching exercise, be careful not to arch your back. 1. Face a wall, and stand so your fingers can just touch it. 2. Keeping your shoulder down, walk the fingers of your injured arm up the wall as high as pain permits. (Don't shrug your shoulder up toward your ear.)  3. Hold your arm in that position for at least 15 to 30 seconds. 4. Slowly walk your fingers back down to where you started. 5. Repeat at least 2 to 4 times. Try to reach higher each time. Shoulder blade squeeze    1. Stand with your arms at your sides, and squeeze your shoulder blades together. Do not raise your shoulders up as you squeeze. 2. Hold 6 seconds. 3. Repeat 8 to 12 times. Scapular exercise: Arm reach    1. Lie flat on your back. This exercise is a very slight motion that starts with your arms raised (elbows straight, arms straight). 2. From this position, reach higher toward the varsha or ceiling. Keep your elbows straight. All motion should be from your shoulder blade only. 3. Relax your arms back to where you started. 4. Repeat 8 to 12 times. Arm raise to the side    During this strengthening exercise, your arm should stay about 30 degrees to the front of your side. 1. Slowly raise your injured arm to the side, with your thumb facing up.  Raise your arm 60 degrees at the most (shoulder level is 90 degrees). 2. Hold the position for 3 to 5 seconds. Then lower your arm back to your side. If you need to, bring your \"good\" arm across your body and place it under the elbow as you lower your injured arm. Use your good arm to keep your injured arm from dropping down too fast.  3. Repeat 8 to 12 times. 4. When you first start out, don't hold any extra weight in your hand. As you get stronger, you may use a 1-pound to 2-pound dumbbell or a small can of food. Shoulder flexor and extensor exercise    These are isometric exercises. That means you contract your muscles without actually moving. 1. Push forward (flex): Stand facing a wall or doorjamb, about 6 inches or less back. Hold your injured arm against your body. Make a closed fist with your thumb on top. Then gently push your hand forward into the wall with about 25% to 50% of your strength. Don't let your body move backward as you push. Hold for about 6 seconds. Relax for a few seconds. Repeat 8 to 12 times. 2. Push backward (extend): Stand with your back flat against a wall. Your upper arm should be against the wall, with your elbow bent 90 degrees (your hand straight ahead). Push your elbow gently back against the wall with about 25% to 50% of your strength. Don't let your body move forward as you push. Hold for about 6 seconds. Relax for a few seconds. Repeat 8 to 12 times. Scapular exercise: Wall push-ups    This exercise is best done with your fingers somewhat turned out, rather than straight up and down. 1. Stand facing a wall, about 12 inches to 18 inches away. 2. Place your hands on the wall at shoulder height. 3. Slowly bend your elbows and bring your face to the wall. Keep your back and hips straight. 4. Push back to where you started. 5. Repeat 8 to 12 times. 6. When you can do this exercise against a wall comfortably, you can try it against a counter.  You can then slowly progress to the end of a couch, then to a sturdy chair, and finally to the floor. Scapular exercise: Retraction    For this exercise, you will need elastic exercise material, such as surgical tubing or Thera-Band. 1. Put the band around a solid object at about waist level. (A bedpost will work well.) Each hand should hold an end of the band. 2. With your elbows at your sides and bent to 90 degrees, pull the band back. Your shoulder blades should move toward each other. Then move your arms back where you started. 3. Repeat 8 to 12 times. 4. If you have good range of motion in your shoulders, try this exercise with your arms lifted out to the sides. Keep your elbows at a 90-degree angle. Raise the elastic band up to about shoulder level. Pull the band back to move your shoulder blades toward each other. Then move your arms back where you started. Internal rotator strengthening exercise    1. Start by tying a piece of elastic exercise material to a doorknob. You can use surgical tubing or Thera-Band. 2. Stand or sit with your shoulder relaxed and your elbow bent 90 degrees. Your upper arm should rest comfortably against your side. Squeeze a rolled towel between your elbow and your body for comfort. This will help keep your arm at your side. 3. Hold one end of the elastic band in the hand of the painful arm. 4. Slowly rotate your forearm toward your body until it touches your belly. Slowly move it back to where you started. 5. Keep your elbow and upper arm firmly tucked against the towel roll or at your side. 6. Repeat 8 to 12 times. External rotator strengthening exercise    1. Start by tying a piece of elastic exercise material to a doorknob. You can use surgical tubing or Thera-Band. (You may also hold one end of the band in each hand.)  2. Stand or sit with your shoulder relaxed and your elbow bent 90 degrees. Your upper arm should rest comfortably against your side. Squeeze a rolled towel between your elbow and your body for comfort.  This will help keep your arm at your side. 3. Hold one end of the elastic band with the hand of the painful arm. 4. Start with your forearm across your belly. Slowly rotate the forearm out away from your body. Keep your elbow and upper arm tucked against the towel roll or the side of your body until you begin to feel tightness in your shoulder. Slowly move your arm back to where you started. 5. Repeat 8 to 12 times. Follow-up care is a key part of your treatment and safety. Be sure to make and go to all appointments, and call your doctor if you are having problems. It's also a good idea to know your test results and keep a list of the medicines you take. Where can you learn more? Go to http://phyllis-harsh.info/. Enter Leah Shaw in the search box to learn more about \"Rotator Cuff: Exercises. \"  Current as of: March 21, 2017  Content Version: 11.4  © 7949-3351 [employer]. Care instructions adapted under license by [employer] (which disclaims liability or warranty for this information). If you have questions about a medical condition or this instruction, always ask your healthcare professional. [address] any warranty or liability for your use of this information.

## 2018-06-27 LAB — HEMOCCULT STL QL IA: NEGATIVE

## 2018-07-02 DIAGNOSIS — M54.9 MECHANICAL BACK PAIN: ICD-10-CM

## 2018-07-02 RX ORDER — MELOXICAM 15 MG/1
15 TABLET ORAL DAILY
Qty: 30 TAB | Refills: 2 | Status: SHIPPED | OUTPATIENT
Start: 2018-07-02 | End: 2022-03-07

## 2018-07-03 RX ORDER — CYCLOBENZAPRINE HCL 10 MG
TABLET ORAL
Qty: 30 TAB | Refills: 1 | Status: SHIPPED | OUTPATIENT
Start: 2018-07-03 | End: 2020-01-22

## 2018-09-21 RX ORDER — HYDROCHLOROTHIAZIDE 25 MG/1
TABLET ORAL
Qty: 90 TAB | Refills: 3 | Status: SHIPPED | OUTPATIENT
Start: 2018-09-21 | End: 2019-09-10 | Stop reason: SDUPTHER

## 2019-03-06 DIAGNOSIS — F41.1 GAD (GENERALIZED ANXIETY DISORDER): ICD-10-CM

## 2019-03-06 RX ORDER — FLUOXETINE HYDROCHLORIDE 40 MG/1
CAPSULE ORAL
Qty: 90 CAP | Refills: 3 | Status: SHIPPED | OUTPATIENT
Start: 2019-03-06 | End: 2020-03-09 | Stop reason: SDUPTHER

## 2019-07-10 ENCOUNTER — OFFICE VISIT (OUTPATIENT)
Dept: FAMILY MEDICINE CLINIC | Age: 59
End: 2019-07-10

## 2019-07-10 VITALS
BODY MASS INDEX: 25.71 KG/M2 | RESPIRATION RATE: 16 BRPM | TEMPERATURE: 98 F | SYSTOLIC BLOOD PRESSURE: 131 MMHG | OXYGEN SATURATION: 96 % | HEART RATE: 79 BPM | WEIGHT: 160 LBS | HEIGHT: 66 IN | DIASTOLIC BLOOD PRESSURE: 81 MMHG

## 2019-07-10 DIAGNOSIS — K11.7 XEROSTOMIA: ICD-10-CM

## 2019-07-10 DIAGNOSIS — F41.1 GAD (GENERALIZED ANXIETY DISORDER): ICD-10-CM

## 2019-07-10 DIAGNOSIS — Z00.00 ROUTINE GENERAL MEDICAL EXAMINATION AT A HEALTH CARE FACILITY: Primary | ICD-10-CM

## 2019-07-10 DIAGNOSIS — I10 ESSENTIAL HYPERTENSION: ICD-10-CM

## 2019-07-10 RX ORDER — ASPIRIN 81 MG/1
TABLET ORAL DAILY
COMMUNITY

## 2019-07-10 NOTE — PROGRESS NOTES
.  Chief Complaint   Patient presents with    Blood Pressure Check    Heartburn    Other     dry mouth     . 1. Have you been to the ER, urgent care clinic since your last visit? Hospitalized since your last visit? no    2. Have you seen or consulted any other health care providers outside of the [address] [place] [address] since your last visit? Include any pap smears or colon screening. No    .  Health Maintenance Due   Topic Date Due    Shingrix Vaccine Age 50> (1 of 2) 05/06/2010    FOBT Q 1 YEAR AGE 50-75  06/22/2019     . Anabella Moore

## 2019-07-10 NOTE — PATIENT INSTRUCTIONS
Dry mouth Stop Prozac for 1 week to see if this helps. Nexium Start taking Zantac 150 mg twice daily Start taking Nexium every other day for 1 week Then every third day for 1 week Then stop Nexium and continue Zantac for another 2 weeks then stop. If you are unable to wean off of the Nexium let us know and we will refer you to gastroenterology

## 2019-07-10 NOTE — PROGRESS NOTES
HPI  Albina Solis is a 61 y.o. female who presents to follow-up on chronic medical issues. She has a complaint about dry mouth. Has had dry mucous membranes in general over the last year which she has found strange. Started out with dry eye that she saw her eye doctor for. Then her nose felt dry over the winter. Then her mouth is felt dry for the last few months. She brought this up with her dentist who did not feel it was a dental issue. Only seems to have been on the inside of her lips. Does not happen on the tongue or buccal mucosa. Lips will stick to her teeth. Denies OTC medications    She has tried to wean herself off Nexium but can only make it 2 days. Has not tried H2 blocker. Has been taking Nexium for years    PMHx:  Past Medical History:   Diagnosis Date    Depression 2/26/2010    Hypertension 2/26/2010       Meds:   Current Outpatient Medications   Medication Sig Dispense Refill    aspirin delayed-release 81 mg tablet Take  by mouth daily.  FLUoxetine (PROZAC) 40 mg capsule TAKE 1 CAPSULE BY MOUTH DAILY FOR MOOD 90 Cap 3    hydroCHLOROthiazide (HYDRODIURIL) 25 mg tablet TAKE 1 TABLET BY MOUTH DAILY. 90 Tab 3    cyclobenzaprine (FLEXERIL) 10 mg tablet TAKE 1 TABLET BY MOUTH THREE TIMES DAILY AS NEEDED FOR MUSCLE SPASMS 30 Tab 1    valACYclovir (VALTREX) 1 gram tablet Take  by mouth.  esomeprazole (NEXIUM) 20 mg capsule Take  by mouth daily.  meloxicam (MOBIC) 15 mg tablet Take 1 Tab by mouth daily. For back pain 30 Tab 2       Allergies:   No Known Allergies    Smoker:  Social History     Tobacco Use   Smoking Status Never Smoker   Smokeless Tobacco Never Used       ETOH:   Social History     Substance and Sexual Activity   Alcohol Use No       FH:   Family History   Problem Relation Age of Onset    Heart Disease Brother         cancer       ROS:   As listed in HPI.  In addition:  Constitutional:   No headache, fever, fatigue, weight loss or weight gain      Cardiac: No chest pain      Resp:   No cough or shortness of breath      Neuro   No loss of consciousness, dizziness, seizures      Physical Exam:  Blood pressure 131/81, pulse 79, temperature 98 °F (36.7 °C), temperature source Oral, resp. rate 16, height 5' 6\" (1.676 m), weight 160 lb (72.6 kg), SpO2 96 %. GEN: No apparent distress. Alert and oriented and responds to all questions appropriately. NEUROLOGIC:  No focal neurologic deficits. Strength and sensation grossly intact. Coordination and gait grossly intact. EXT: Well perfused. No edema. SKIN: No obvious rashes. Mouth examined. Barely perceptible area of dryness on the inside of her lips. Otherwise normal oromucosa. Nose is not congested. Tympanic membranes clear without effusion       Assessment and Plan     Surveillance labs  Fit kit    Hyperlipidemia  Will assess ACS risk. Did offer CT heart scan if she is in the intermediate risk. She thinks she would just as soon start taking the medication because of her family history    Hypertension  Well-controlled  HCTZ 25 mg    Anxiety  Happy with Prozac 40 mg, unfortunately carries a 1 in 10 chance of 0 stoma so she will stop this medication for a week to see if it helps. Cursory glance at other SSRIs have the same side effect. Will need to do a little research if she requires an alternative SSRI. Would probably go with least evil    Xerostomia  Trial of Prozac  GERD  Strategy to wean off of Nexium that involves Zantac. If you cannot come off PPI would refer to GI      ICD-10-CM ICD-9-CM    1. Routine general medical examination at a health care facility Z00.00 V70.0    2. Essential hypertension I10 401.9 TSH 3RD GENERATION      METABOLIC PANEL, COMPREHENSIVE      CBC WITH AUTOMATED DIFF      LIPID PANEL      OCCULT BLOOD IMMUNOASSAY,DIAGNOSTIC   3. MARIA VICTORIA (generalized anxiety disorder) F41.1 300.02    4. Xerostomia R68.2 527.7        AVS given.  Pt expressed understanding of instructions

## 2019-07-11 DIAGNOSIS — E78.1 HYPERTRIGLYCERIDEMIA: Primary | ICD-10-CM

## 2019-07-11 LAB
ALBUMIN SERPL-MCNC: 4.2 G/DL (ref 3.5–5.5)
ALBUMIN/GLOB SERPL: 1.4 {RATIO} (ref 1.2–2.2)
ALP SERPL-CCNC: 82 IU/L (ref 39–117)
ALT SERPL-CCNC: 26 IU/L (ref 0–32)
AST SERPL-CCNC: 20 IU/L (ref 0–40)
BASOPHILS # BLD AUTO: 0 X10E3/UL (ref 0–0.2)
BASOPHILS NFR BLD AUTO: 0 %
BILIRUB SERPL-MCNC: <0.2 MG/DL (ref 0–1.2)
BUN SERPL-MCNC: 17 MG/DL (ref 6–24)
BUN/CREAT SERPL: 27 (ref 9–23)
CALCIUM SERPL-MCNC: 9.5 MG/DL (ref 8.7–10.2)
CHLORIDE SERPL-SCNC: 98 MMOL/L (ref 96–106)
CHOLEST SERPL-MCNC: 294 MG/DL (ref 100–199)
CO2 SERPL-SCNC: 26 MMOL/L (ref 20–29)
CREAT SERPL-MCNC: 0.64 MG/DL (ref 0.57–1)
EOSINOPHIL # BLD AUTO: 0.1 X10E3/UL (ref 0–0.4)
EOSINOPHIL NFR BLD AUTO: 1 %
ERYTHROCYTE [DISTWIDTH] IN BLOOD BY AUTOMATED COUNT: 14.5 % (ref 12.3–15.4)
GLOBULIN SER CALC-MCNC: 3.1 G/DL (ref 1.5–4.5)
GLUCOSE SERPL-MCNC: 156 MG/DL (ref 65–99)
HCT VFR BLD AUTO: 44.9 % (ref 34–46.6)
HDLC SERPL-MCNC: 40 MG/DL
HGB BLD-MCNC: 14.8 G/DL (ref 11.1–15.9)
IMM GRANULOCYTES # BLD AUTO: 0 X10E3/UL (ref 0–0.1)
IMM GRANULOCYTES NFR BLD AUTO: 0 %
INTERPRETATION, 910389: NORMAL
LDLC SERPL CALC-MCNC: ABNORMAL MG/DL (ref 0–99)
LYMPHOCYTES # BLD AUTO: 2.9 X10E3/UL (ref 0.7–3.1)
LYMPHOCYTES NFR BLD AUTO: 38 %
MCH RBC QN AUTO: 29.1 PG (ref 26.6–33)
MCHC RBC AUTO-ENTMCNC: 33 G/DL (ref 31.5–35.7)
MCV RBC AUTO: 88 FL (ref 79–97)
MONOCYTES # BLD AUTO: 0.4 X10E3/UL (ref 0.1–0.9)
MONOCYTES NFR BLD AUTO: 6 %
NEUTROPHILS # BLD AUTO: 4.3 X10E3/UL (ref 1.4–7)
NEUTROPHILS NFR BLD AUTO: 55 %
PLATELET # BLD AUTO: 267 X10E3/UL (ref 150–450)
POTASSIUM SERPL-SCNC: 4 MMOL/L (ref 3.5–5.2)
PROT SERPL-MCNC: 7.3 G/DL (ref 6–8.5)
RBC # BLD AUTO: 5.09 X10E6/UL (ref 3.77–5.28)
SODIUM SERPL-SCNC: 142 MMOL/L (ref 134–144)
TRIGL SERPL-MCNC: 548 MG/DL (ref 0–149)
TSH SERPL DL<=0.005 MIU/L-ACNC: 1.96 UIU/ML (ref 0.45–4.5)
VLDLC SERPL CALC-MCNC: ABNORMAL MG/DL (ref 5–40)
WBC # BLD AUTO: 7.7 X10E3/UL (ref 3.4–10.8)

## 2019-07-11 NOTE — TELEPHONE ENCOUNTER
Labs reviewed. Please let patient know that her triglycerides are extremely high. Looks like they have been creeping up for a while. We had had a discussion about possibly starting cholesterol medication. When triglycerides are this high that is an absolute indication to start cholesterol medication. Will call and pravastatin 40 mg if she is agreeable.      actually need to make sure this comes down, advise rechecking at a lab visit in about 6 weeks (end of August)

## 2019-07-12 RX ORDER — PRAVASTATIN SODIUM 40 MG/1
40 TABLET ORAL
Qty: 90 TAB | Refills: 3 | Status: SHIPPED | OUTPATIENT
Start: 2019-07-12 | End: 2020-07-20

## 2019-07-12 NOTE — TELEPHONE ENCOUNTER
Pt agrees to start med and will come in at the end of August to recheck labs please order Future labs

## 2019-09-10 RX ORDER — HYDROCHLOROTHIAZIDE 25 MG/1
TABLET ORAL
Qty: 90 TAB | Refills: 3 | Status: SHIPPED | OUTPATIENT
Start: 2019-09-10 | End: 2020-08-28

## 2020-01-22 ENCOUNTER — OFFICE VISIT (OUTPATIENT)
Dept: FAMILY MEDICINE CLINIC | Age: 60
End: 2020-01-22

## 2020-01-22 VITALS
DIASTOLIC BLOOD PRESSURE: 79 MMHG | SYSTOLIC BLOOD PRESSURE: 133 MMHG | RESPIRATION RATE: 16 BRPM | HEIGHT: 66 IN | WEIGHT: 146 LBS | BODY MASS INDEX: 23.46 KG/M2 | TEMPERATURE: 98.2 F | HEART RATE: 72 BPM | OXYGEN SATURATION: 98 %

## 2020-01-22 DIAGNOSIS — E78.1 HYPERTRIGLYCERIDEMIA: Primary | ICD-10-CM

## 2020-01-22 DIAGNOSIS — I10 ESSENTIAL HYPERTENSION: ICD-10-CM

## 2020-01-22 DIAGNOSIS — Z12.11 COLON CANCER SCREENING: ICD-10-CM

## 2020-01-22 DIAGNOSIS — R73.02 IGT (IMPAIRED GLUCOSE TOLERANCE): ICD-10-CM

## 2020-01-22 DIAGNOSIS — Z23 ENCOUNTER FOR IMMUNIZATION: ICD-10-CM

## 2020-01-22 DIAGNOSIS — Z12.31 ENCOUNTER FOR SCREENING MAMMOGRAM FOR MALIGNANT NEOPLASM OF BREAST: ICD-10-CM

## 2020-01-22 NOTE — PROGRESS NOTES
Chief Complaint   Patient presents with    Medication Evaluation     1. Have you been to the ER, urgent care clinic since your last visit? Hospitalized since your last visit? No    2. Have you seen or consulted any other health care providers outside of the [address] [place] [address] since your last visit? Include any pap smears or colon screening. No    Health Maintenance Due   Topic Date Due    Shingrix Vaccine Age 49> (1 of 2) 05/06/2010    FOBT Q 1 YEAR AGE 50-75  06/22/2019    Influenza Age 5 to Adult  08/01/2019    BREAST CANCER SCRN MAMMOGRAM  01/04/2020     Deysi Vann is a 61 y.o. female who presents for routine immunizations. She denies any symptoms , reactions or allergies that would exclude them from being immunized today. Risks and adverse reactions were discussed and the VIS was given to them. All questions were addressed. She was observed for 10 min post injection. There were no reactions observed.     Will Malone

## 2020-01-22 NOTE — PATIENT INSTRUCTIONS
Vaccine Information Statement    Influenza (Flu) Vaccine (Inactivated or Recombinant): What You Need to Know    Many Vaccine Information Statements are available in [place]ian and other languages. See www.immunize.org/vis  Hojas de información sobre vacunas están disponibles en español y en muchos otros idiomas. Visite www.immunize.org/vis    1. Why get vaccinated? Influenza vaccine can prevent influenza (flu). Flu is a contagious disease that spreads around the [facility] every year, usually between October and May. Anyone can get the flu, but it is more dangerous for some people. Infants and young children, people 72years of age and older, pregnant women, and people with certain health conditions or a weakened immune system are at greatest risk of flu complications. Pneumonia, bronchitis, sinus infections and ear infections are examples of flu-related complications. If you have a medical condition, such as heart disease, cancer or diabetes, flu can make it worse. Flu can cause fever and chills, sore throat, muscle aches, fatigue, cough, headache, and runny or stuffy nose. Some people may have vomiting and diarrhea, though this is more common in children than adults. Each year thousands of people in the [facility] die from flu, and many more are hospitalized. Flu vaccine prevents millions of illnesses and flu-related visits to the doctor each year. 2. Influenza vaccines     CDC recommends everyone 10months of age and older get vaccinated every flu season. Children 6 months through 6years of age may need 2 doses during a single flu season. Everyone else needs only 1 dose each flu season. It takes about 2 weeks for protection to develop after vaccination. There are many flu viruses, and they are always changing. Each year a new flu vaccine is made to protect against three or four viruses that are likely to cause disease in the upcoming flu season.  Even when the vaccine doesnt exactly match these viruses, it may still provide some protection. Influenza vaccine does not cause flu. Influenza vaccine may be given at the same time as other vaccines. 3. Talk with your health care provider    Tell your vaccine provider if the person getting the vaccine:   Has had an allergic reaction after a previous dose of influenza vaccine, or has any severe, life-threatening allergies.  Has ever had Guillain-Barré Syndrome (also called GBS). In some cases, your health care provider may decide to postpone influenza vaccination to a future visit. People with minor illnesses, such as a cold, may be vaccinated. People who are moderately or severely ill should usually wait until they recover before getting influenza vaccine. Your health care provider can give you more information. 4. Risks of a reaction     Soreness, redness, and swelling where shot is given, fever, muscle aches, and headache can happen after influenza vaccine.  There may be a very small increased risk of Guillain-Barré Syndrome (GBS) after inactivated influenza vaccine (the flu shot). [facility] children who get the flu shot along with pneumococcal vaccine (PCV13), and/or DTaP vaccine at the same time might be slightly more likely to have a seizure caused by fever. Tell your health care provider if a child who is getting flu vaccine has ever had a seizure. People sometimes faint after medical procedures, including vaccination. Tell your provider if you feel dizzy or have vision changes or ringing in the ears. As with any medicine, there is a very remote chance of a vaccine causing a severe allergic reaction, other serious injury, or death. 5. What if there is a serious problem? An allergic reaction could occur after the vaccinated person leaves the clinic.  If you see signs of a severe allergic reaction (hives, swelling of the face and throat, difficulty breathing, a fast heartbeat, dizziness, or weakness), call 9-1-1 and get the person to the nearest hospital.    For other signs that concern you, call your health care provider. Adverse reactions should be reported to the Vaccine Adverse Event Reporting System (VAERS). Your health care provider will usually file this report, or you can do it yourself. Visit the VAERS website at www.vaers. [facility].gov or call 7-368.414.2809. VAERS is only for reporting reactions, and VAERS staff do not give medical advice. 6. The National Vaccine Injury Compensation Program    The [facility] Vaccine Injury Compensation Program (VICP) is a federal program that was created to compensate people who may have been injured by certain vaccines. Visit the VICP website at www.hrsa.gov/vaccinecompensation or call 1-448.799.6666 to learn about the program and about filing a claim. There is a time limit to file a claim for compensation. 7. How can I learn more?  Ask your health care provider.  Call your local or state health department.  Contact the Centers for Disease Control and Prevention (CDC):  - Call 8-414.998.9726 (1-800-CDC-INFO) or  - Visit CDCs influenza website at www.cdc.gov/flu    Vaccine Information Statement (Interim)  Inactivated Influenza Vaccine   8/15/2019  [address] [id number]   Department of Health and Human Services  Centers for Disease Control and Prevention    Office Use Only

## 2020-01-22 NOTE — PROGRESS NOTES
HPI  Sindy Yost is a 61 y.o. female who presents to follow up on chronic medical issues. She is tolerating her hydrochlorothiazide. She is doing well on Prozac 40 mg, feels like her anxiety is reasonably well controlled although does complain of occasional tension with her  but feels that they are able to work things out. Has not done counseling recently. She was able to wean herself off of Nexium. She is lost 14 pounds and decrease the fried food increase fruit and water. She stopped drinking soda. Is only needing the occasional Tums for reflux symptoms    PMHx:  Past Medical History:   Diagnosis Date    Depression 2/26/2010    Hypertension 2/26/2010       Meds:   Current Outpatient Medications   Medication Sig Dispense Refill    hydroCHLOROthiazide (HYDRODIURIL) 25 mg tablet TAKE 1 TABLET BY MOUTH DAILY. 90 Tab 3    pravastatin (PRAVACHOL) 40 mg tablet Take 1 Tab by mouth nightly. 90 Tab 3    aspirin delayed-release 81 mg tablet Take  by mouth daily.  FLUoxetine (PROZAC) 40 mg capsule TAKE 1 CAPSULE BY MOUTH DAILY FOR MOOD 90 Cap 3    meloxicam (MOBIC) 15 mg tablet Take 1 Tab by mouth daily. For back pain 30 Tab 2    cyclobenzaprine (FLEXERIL) 10 mg tablet TAKE 1 TABLET BY MOUTH THREE TIMES DAILY AS NEEDED FOR MUSCLE SPASMS 30 Tab 1    valACYclovir (VALTREX) 1 gram tablet Take  by mouth.  esomeprazole (NEXIUM) 20 mg capsule Take  by mouth daily. Allergies:   No Known Allergies    Smoker:  Social History     Tobacco Use   Smoking Status Never Smoker   Smokeless Tobacco Never Used       ETOH:   Social History     Substance and Sexual Activity   Alcohol Use No       FH:   Family History   Problem Relation Age of Onset    Heart Disease Brother         cancer       ROS:   As listed in HPI.  In addition:  Constitutional:   No headache, fever, fatigue, weight loss or weight gain      Cardiac:    No chest pain      Resp:   No cough or shortness of breath      Neuro   No loss of consciousness, dizziness, seizures      Physical Exam:  Blood pressure 133/79, pulse 72, temperature 98.2 °F (36.8 °C), temperature source Oral, resp. rate 16, height 5' 6\" (1.676 m), weight 146 lb (66.2 kg), SpO2 98 %. GEN: No apparent distress. Alert and oriented and responds to all questions appropriately. NEUROLOGIC:  No focal neurologic deficits. Strength and sensation grossly intact. Coordination and gait grossly intact. EXT: Well perfused. No edema. SKIN: No obvious rashes. Lungs clear to auscultation bilaterally  CV regular rate rhythm no murmur  HEENT clear tympanic membrane clear mucosa clear oromucosa       Assessment and Plan     Hypertriglyceridemia  High in the past but 548 and 7/2019  Pravastatin 40 mg since that  Lipid panel today    Has had elevated blood sugar but we have checked an A1c these were not fasting numbers  Check an A1c as a matter of course today    Anxiety  Happy with Prozac 40 mg. Having some situational stressors that would be amenable to counseling so suggest that she think about this    Left of the meloxicam on her list but she is only taking this occasionally for back pain    Health maintenance  Did not do her fit kit over the summer, gave her this  Mammogram  Had her Pap smear done recently by GYN  Got her flu shot today    6-month follow-up for wellness and to follow-up on the cholesterol. If stable can stretch to annual      ICD-10-CM ICD-9-CM    1. Hypertriglyceridemia E78.1 272.1 LIPID PANEL   2. Encounter for immunization Z23 V03.89 INFLUENZA VIRUS VAC QUAD,SPLIT,PRESV FREE SYRINGE IM      [place] IMMUNIZ ADMIN,1 SINGLE/COMB VAC/TOXOID   3. Essential hypertension W20 318.2 METABOLIC PANEL, COMPREHENSIVE   4. IGT (impaired glucose tolerance) R73.02 790.22 HEMOGLOBIN A1C WITH EAG   5. Colon cancer screening Z12.11 V76.51 OCCULT BLOOD IMMUNOASSAY,DIAGNOSTIC       AVS given.  Pt expressed understanding of instructions

## 2020-01-23 LAB
ALBUMIN SERPL-MCNC: 4.2 G/DL (ref 3.8–4.9)
ALBUMIN/GLOB SERPL: 1.5 {RATIO} (ref 1.2–2.2)
ALP SERPL-CCNC: 71 IU/L (ref 39–117)
ALT SERPL-CCNC: 20 IU/L (ref 0–32)
AST SERPL-CCNC: 18 IU/L (ref 0–40)
BILIRUB SERPL-MCNC: 0.3 MG/DL (ref 0–1.2)
BUN SERPL-MCNC: 20 MG/DL (ref 6–24)
BUN/CREAT SERPL: 27 (ref 9–23)
CALCIUM SERPL-MCNC: 9.5 MG/DL (ref 8.7–10.2)
CHLORIDE SERPL-SCNC: 100 MMOL/L (ref 96–106)
CHOLEST SERPL-MCNC: 170 MG/DL (ref 100–199)
CO2 SERPL-SCNC: 26 MMOL/L (ref 20–29)
CREAT SERPL-MCNC: 0.74 MG/DL (ref 0.57–1)
EST. AVERAGE GLUCOSE BLD GHB EST-MCNC: 111 MG/DL
GLOBULIN SER CALC-MCNC: 2.8 G/DL (ref 1.5–4.5)
GLUCOSE SERPL-MCNC: 91 MG/DL (ref 65–99)
HBA1C MFR BLD: 5.5 % (ref 4.8–5.6)
HDLC SERPL-MCNC: 46 MG/DL
INTERPRETATION, 910389: NORMAL
LDLC SERPL CALC-MCNC: 77 MG/DL (ref 0–99)
POTASSIUM SERPL-SCNC: 4.8 MMOL/L (ref 3.5–5.2)
PROT SERPL-MCNC: 7 G/DL (ref 6–8.5)
SODIUM SERPL-SCNC: 141 MMOL/L (ref 134–144)
TRIGL SERPL-MCNC: 235 MG/DL (ref 0–149)
VLDLC SERPL CALC-MCNC: 47 MG/DL (ref 5–40)

## 2020-02-04 LAB — HEMOCCULT STL QL IA: NEGATIVE

## 2020-03-09 DIAGNOSIS — F41.1 GAD (GENERALIZED ANXIETY DISORDER): ICD-10-CM

## 2020-03-09 NOTE — TELEPHONE ENCOUNTER
Pt is calling requesting a refill on her med      Requested Prescriptions     Pending Prescriptions Disp Refills    FLUoxetine (PROZAC) 40 mg capsule 90 Cap 3

## 2020-03-10 RX ORDER — FLUOXETINE HYDROCHLORIDE 40 MG/1
CAPSULE ORAL
Qty: 90 CAP | Refills: 3 | Status: SHIPPED | OUTPATIENT
Start: 2020-03-10 | End: 2021-03-05

## 2020-07-20 DIAGNOSIS — E78.1 HYPERTRIGLYCERIDEMIA: ICD-10-CM

## 2020-07-20 RX ORDER — PRAVASTATIN SODIUM 40 MG/1
TABLET ORAL
Qty: 30 TAB | Refills: 3 | Status: SHIPPED | OUTPATIENT
Start: 2020-07-20 | End: 2020-11-16

## 2020-07-22 ENCOUNTER — OFFICE VISIT (OUTPATIENT)
Dept: FAMILY MEDICINE CLINIC | Age: 60
End: 2020-07-22

## 2020-07-22 VITALS
DIASTOLIC BLOOD PRESSURE: 80 MMHG | WEIGHT: 144 LBS | BODY MASS INDEX: 23.14 KG/M2 | OXYGEN SATURATION: 97 % | SYSTOLIC BLOOD PRESSURE: 134 MMHG | HEIGHT: 66 IN | TEMPERATURE: 97.9 F | HEART RATE: 70 BPM | RESPIRATION RATE: 17 BRPM

## 2020-07-22 DIAGNOSIS — E78.1 HYPERTRIGLYCERIDEMIA: ICD-10-CM

## 2020-07-22 DIAGNOSIS — I10 ESSENTIAL HYPERTENSION: ICD-10-CM

## 2020-07-22 DIAGNOSIS — F41.1 GAD (GENERALIZED ANXIETY DISORDER): ICD-10-CM

## 2020-07-22 DIAGNOSIS — Z00.00 ROUTINE GENERAL MEDICAL EXAMINATION AT A HEALTH CARE FACILITY: Primary | ICD-10-CM

## 2020-07-22 NOTE — PROGRESS NOTES
Chief Complaint   Patient presents with    Hypertension     6 month follow-up      Health Maintenance reviewed     1. Have you been to the ER, urgent care clinic since your last visit? Hospitalized since your last visit? No     2. Have you seen or consulted any other health care providers outside of the [address] [place] [address] since your last visit? Include any pap smears or colon screening.   No

## 2020-07-22 NOTE — PROGRESS NOTES
HPI  Ricardo Bird is a 61 y.o. female who presents to follow up on chronic medical issues. She is tolerating her hydrochlorothiazide. She is doing well on Prozac 40 mg, feels like her anxiety is reasonably well controlled although does complain of occasional tension with her  but feels that they are able to work things out. Has not done counseling recently. She was able to wean herself off of Nexium. She is lost 14 pounds and decrease the fried food increase fruit and water. She stopped drinking soda. Is only needing the occasional Tums for reflux symptoms    PMHx:  Past Medical History:   Diagnosis Date    Depression 2/26/2010    Hypertension 2/26/2010       Meds:   Current Outpatient Medications   Medication Sig Dispense Refill    pravastatin (PRAVACHOL) 40 mg tablet TAKE 1 TABLET BY MOUTH EVERY DAY NIGHTLY FOR CHOLESTEROL 30 Tab 3    FLUoxetine (PROZAC) 40 mg capsule TAKE 1 CAPSULE BY MOUTH DAILY FOR MOOD 90 Cap 3    hydroCHLOROthiazide (HYDRODIURIL) 25 mg tablet TAKE 1 TABLET BY MOUTH DAILY. 90 Tab 3    aspirin delayed-release 81 mg tablet Take  by mouth daily.  meloxicam (MOBIC) 15 mg tablet Take 1 Tab by mouth daily. For back pain 30 Tab 2       Allergies:   No Known Allergies    Smoker:  Social History     Tobacco Use   Smoking Status Never Smoker   Smokeless Tobacco Never Used       ETOH:   Social History     Substance and Sexual Activity   Alcohol Use No       FH:   Family History   Problem Relation Age of Onset    Heart Disease Brother         cancer       ROS:   As listed in HPI. In addition:  Constitutional:   No headache, fever, fatigue, weight loss or weight gain      Cardiac:    No chest pain      Resp:   No cough or shortness of breath      Neuro   No loss of consciousness, dizziness, seizures      Physical Exam:  Blood pressure 134/80, pulse 70, temperature 97.9 °F (36.6 °C), temperature source Temporal, resp.  rate 17, height 5' 6\" (1.676 m), weight 144 lb (65.3 kg), SpO2 97 %. GEN: No apparent distress. Alert and oriented and responds to all questions appropriately. NEUROLOGIC:  No focal neurologic deficits. Strength and sensation grossly intact. Coordination and gait grossly intact. EXT: Well perfused. No edema. SKIN: No obvious rashes. Lungs clear to auscultation bilaterally  CV regular rate rhythm no murmur  HEENT clear tympanic membrane clear mucosa clear oromucosa       Assessment and Plan     Hypertriglyceridemia  High in the past but 548 and 7/2019  Pravastatin 40 mg since that  Lipid panel today    Anxiety  Happy with Prozac 40 mg. Having some situational stressors that would be amenable to counseling so suggest that she think about this     Left of the meloxicam on her list but she is only taking this occasionally for back pain    Health maintenance  Fit kin in winter  Mammogram 606/706 [facility] Kamara  Had her Pap smear done recently by GYN    If Labs look good, annual follow-up      ICD-10-CM ICD-9-CM    1. Routine general medical examination at a health care facility  Z00.00 V70.0    2. Essential hypertension  I10 401.9 LIPID PANEL      CBC WITH AUTOMATED DIFF      METABOLIC PANEL, COMPREHENSIVE   3. Hypertriglyceridemia  E78.1 272.1 LIPID PANEL   4. MARIA VICTORIA (generalized anxiety disorder)  F41.1 300.02        AVS given.  Pt expressed understanding of instructions

## 2020-07-23 LAB
ALBUMIN SERPL-MCNC: 4.4 G/DL (ref 3.8–4.9)
ALBUMIN/GLOB SERPL: 1.8 {RATIO} (ref 1.2–2.2)
ALP SERPL-CCNC: 68 IU/L (ref 39–117)
ALT SERPL-CCNC: 22 IU/L (ref 0–32)
AST SERPL-CCNC: 18 IU/L (ref 0–40)
BASOPHILS # BLD AUTO: 0 X10E3/UL (ref 0–0.2)
BASOPHILS NFR BLD AUTO: 1 %
BILIRUB SERPL-MCNC: 0.4 MG/DL (ref 0–1.2)
BUN SERPL-MCNC: 18 MG/DL (ref 8–27)
BUN/CREAT SERPL: 28 (ref 12–28)
CALCIUM SERPL-MCNC: 9.2 MG/DL (ref 8.7–10.3)
CHLORIDE SERPL-SCNC: 98 MMOL/L (ref 96–106)
CHOLEST SERPL-MCNC: 176 MG/DL (ref 100–199)
CO2 SERPL-SCNC: 27 MMOL/L (ref 20–29)
CREAT SERPL-MCNC: 0.64 MG/DL (ref 0.57–1)
EOSINOPHIL # BLD AUTO: 0.1 X10E3/UL (ref 0–0.4)
EOSINOPHIL NFR BLD AUTO: 1 %
ERYTHROCYTE [DISTWIDTH] IN BLOOD BY AUTOMATED COUNT: 13.1 % (ref 11.7–15.4)
GLOBULIN SER CALC-MCNC: 2.4 G/DL (ref 1.5–4.5)
GLUCOSE SERPL-MCNC: 75 MG/DL (ref 65–99)
HCT VFR BLD AUTO: 46.7 % (ref 34–46.6)
HDLC SERPL-MCNC: 47 MG/DL
HGB BLD-MCNC: 15.5 G/DL (ref 11.1–15.9)
IMM GRANULOCYTES # BLD AUTO: 0 X10E3/UL (ref 0–0.1)
IMM GRANULOCYTES NFR BLD AUTO: 0 %
INTERPRETATION, 910389: NORMAL
LDLC SERPL CALC-MCNC: 89 MG/DL (ref 0–99)
LYMPHOCYTES # BLD AUTO: 2.6 X10E3/UL (ref 0.7–3.1)
LYMPHOCYTES NFR BLD AUTO: 43 %
MCH RBC QN AUTO: 29 PG (ref 26.6–33)
MCHC RBC AUTO-ENTMCNC: 33.2 G/DL (ref 31.5–35.7)
MCV RBC AUTO: 88 FL (ref 79–97)
MONOCYTES # BLD AUTO: 0.4 X10E3/UL (ref 0.1–0.9)
MONOCYTES NFR BLD AUTO: 7 %
NEUTROPHILS # BLD AUTO: 2.9 X10E3/UL (ref 1.4–7)
NEUTROPHILS NFR BLD AUTO: 48 %
PLATELET # BLD AUTO: 235 X10E3/UL (ref 150–450)
POTASSIUM SERPL-SCNC: 4.3 MMOL/L (ref 3.5–5.2)
PROT SERPL-MCNC: 6.8 G/DL (ref 6–8.5)
RBC # BLD AUTO: 5.34 X10E6/UL (ref 3.77–5.28)
SODIUM SERPL-SCNC: 140 MMOL/L (ref 134–144)
TRIGL SERPL-MCNC: 198 MG/DL (ref 0–149)
VLDLC SERPL CALC-MCNC: 40 MG/DL (ref 5–40)
WBC # BLD AUTO: 6 X10E3/UL (ref 3.4–10.8)

## 2020-08-28 RX ORDER — HYDROCHLOROTHIAZIDE 25 MG/1
TABLET ORAL
Qty: 90 TAB | Refills: 1 | Status: SHIPPED | OUTPATIENT
Start: 2020-08-28 | End: 2021-03-16

## 2020-11-16 DIAGNOSIS — E78.1 HYPERTRIGLYCERIDEMIA: ICD-10-CM

## 2020-11-16 RX ORDER — PRAVASTATIN SODIUM 40 MG/1
TABLET ORAL
Qty: 90 TAB | Refills: 1 | Status: SHIPPED | OUTPATIENT
Start: 2020-11-16 | End: 2021-05-14

## 2021-03-05 DIAGNOSIS — F41.1 GAD (GENERALIZED ANXIETY DISORDER): ICD-10-CM

## 2021-03-05 RX ORDER — FLUOXETINE HYDROCHLORIDE 40 MG/1
CAPSULE ORAL
Qty: 90 CAP | Refills: 3 | Status: SHIPPED | OUTPATIENT
Start: 2021-03-05 | End: 2022-03-07

## 2021-03-16 RX ORDER — HYDROCHLOROTHIAZIDE 25 MG/1
TABLET ORAL
Qty: 90 TAB | Refills: 1 | Status: SHIPPED | OUTPATIENT
Start: 2021-03-16 | End: 2021-09-13

## 2021-05-14 DIAGNOSIS — E78.1 HYPERTRIGLYCERIDEMIA: ICD-10-CM

## 2021-05-14 RX ORDER — PRAVASTATIN SODIUM 40 MG/1
TABLET ORAL
Qty: 90 TAB | Refills: 1 | Status: SHIPPED | OUTPATIENT
Start: 2021-05-14 | End: 2021-11-15

## 2021-09-13 RX ORDER — HYDROCHLOROTHIAZIDE 25 MG/1
TABLET ORAL
Qty: 90 TABLET | Refills: 0 | Status: SHIPPED | OUTPATIENT
Start: 2021-09-13 | End: 2021-12-08

## 2021-11-13 DIAGNOSIS — E78.1 HYPERTRIGLYCERIDEMIA: ICD-10-CM

## 2021-11-15 RX ORDER — PRAVASTATIN SODIUM 40 MG/1
TABLET ORAL
Qty: 90 TABLET | Refills: 0 | Status: SHIPPED | OUTPATIENT
Start: 2021-11-15 | End: 2022-02-18 | Stop reason: SDUPTHER

## 2021-12-08 RX ORDER — HYDROCHLOROTHIAZIDE 25 MG/1
TABLET ORAL
Qty: 90 TABLET | Refills: 0 | Status: SHIPPED | OUTPATIENT
Start: 2021-12-08 | End: 2022-03-03

## 2022-02-18 DIAGNOSIS — E78.1 HYPERTRIGLYCERIDEMIA: ICD-10-CM

## 2022-02-18 RX ORDER — PRAVASTATIN SODIUM 40 MG/1
TABLET ORAL
Qty: 30 TABLET | Refills: 0 | Status: SHIPPED | OUTPATIENT
Start: 2022-02-18 | End: 2022-03-14

## 2022-02-18 NOTE — TELEPHONE ENCOUNTER
----- Message from Douglas Nava sent at 2/18/2022 11:53 AM EST -----  Subject: Refill Request    QUESTIONS  Name of Medication? pravastatin (PRAVACHOL) 40 mg tablet  Patient-reported dosage and instructions? 1 a day  How many days do you have left? 2  Preferred [facility]acy? [id number]  Pharmacy phone number (if available)? [phone]  Additional Information for Provider? Pt wants to know if she needs a appt   to receive this medication or needs any blood work.  ---------------------------------------------------------------------------  --------------  [address]  What is the best way for the office to contact you? OK to leave message on   voicemail  Preferred Call Back Phone Number?  [phone]

## 2022-02-18 NOTE — TELEPHONE ENCOUNTER
Requested Prescriptions     Pending Prescriptions Disp Refills    pravastatin (PRAVACHOL) 40 mg tablet 90 Tablet 0     Last office visit: 7/22/2020

## 2022-03-06 DIAGNOSIS — F41.1 GAD (GENERALIZED ANXIETY DISORDER): ICD-10-CM

## 2022-03-07 ENCOUNTER — OFFICE VISIT (OUTPATIENT)
Dept: FAMILY MEDICINE CLINIC | Age: 62
End: 2022-03-07
Payer: COMMERCIAL

## 2022-03-07 VITALS
SYSTOLIC BLOOD PRESSURE: 131 MMHG | OXYGEN SATURATION: 96 % | DIASTOLIC BLOOD PRESSURE: 76 MMHG | WEIGHT: 156.2 LBS | BODY MASS INDEX: 25.1 KG/M2 | HEIGHT: 66 IN | HEART RATE: 73 BPM | RESPIRATION RATE: 16 BRPM | TEMPERATURE: 97.9 F

## 2022-03-07 DIAGNOSIS — Z12.11 COLON CANCER SCREENING: ICD-10-CM

## 2022-03-07 DIAGNOSIS — F41.1 GAD (GENERALIZED ANXIETY DISORDER): ICD-10-CM

## 2022-03-07 DIAGNOSIS — E78.1 HYPERTRIGLYCERIDEMIA: ICD-10-CM

## 2022-03-07 DIAGNOSIS — Z00.00 ROUTINE GENERAL MEDICAL EXAMINATION AT A HEALTH CARE FACILITY: Primary | ICD-10-CM

## 2022-03-07 DIAGNOSIS — I10 PRIMARY HYPERTENSION: ICD-10-CM

## 2022-03-07 PROCEDURE — 99396 PREV VISIT EST AGE 40-64: CPT | Performed by: FAMILY MEDICINE

## 2022-03-07 RX ORDER — FLUOXETINE HYDROCHLORIDE 40 MG/1
CAPSULE ORAL
Qty: 90 CAPSULE | Refills: 3 | Status: SHIPPED | OUTPATIENT
Start: 2022-03-07

## 2022-03-07 RX ORDER — OMEPRAZOLE 40 MG/1
40 CAPSULE, DELAYED RELEASE ORAL DAILY
Qty: 30 CAPSULE | Refills: 1 | Status: SHIPPED | OUTPATIENT
Start: 2022-03-07 | End: 2022-04-04 | Stop reason: SDUPTHER

## 2022-03-07 RX ORDER — NEOMYCIN SULFATE, POLYMYXIN B SULFATE, AND DEXAMETHASONE 3.5; 10000; 1 MG/G; [USP'U]/G; MG/G
OINTMENT OPHTHALMIC
COMMUNITY
Start: 2022-02-01 | End: 2022-02-11

## 2022-03-07 RX ORDER — ESTRADIOL 0.1 MG/G
CREAM VAGINAL
COMMUNITY
End: 2022-03-07

## 2022-03-07 NOTE — PROGRESS NOTES
HPI  Shira Franco is a 64 y.o. female who presents to follow up on chronic medical issues. She is tolerating her hydrochlorothiazide. She is doing well on Prozac 40 mg, feels like her anxiety is reasonably well controlled     Had Covid January 2022    PMHx:  Past Medical History:   Diagnosis Date    Depression 2/26/2010    Hypertension 2/26/2010       Meds:   Current Outpatient Medications   Medication Sig Dispense Refill    OMEPRAZOLE PO Take  by mouth.  omeprazole (PRILOSEC) 40 mg capsule Take 1 Capsule by mouth daily. 30 Capsule 1    hydroCHLOROthiazide (HYDRODIURIL) 25 mg tablet TAKE 1 TABLET BY MOUTH EVERY DAY 30 Tablet 0    pravastatin (PRAVACHOL) 40 mg tablet TAKE 1 TABLET BY MOUTH EVERY DAY NIGHTLY FOR CHOLESTEROL 30 Tablet 0    FLUoxetine (PROzac) 40 mg capsule TAKE 1 CAPSULE BY MOUTH EVERY DAY FOR MOOD 90 Cap 3    aspirin delayed-release 81 mg tablet Take  by mouth daily. Allergies:   No Known Allergies    Smoker:  Social History     Tobacco Use   Smoking Status Never Smoker   Smokeless Tobacco Never Used       ETOH:   Social History     Substance and Sexual Activity   Alcohol Use No       FH:   Family History   Problem Relation Age of Onset    Heart Disease Brother         cancer       ROS:   As listed in HPI. In addition:  Constitutional:   No headache, fever, fatigue, weight loss or weight gain      Cardiac:    No chest pain      Resp:   No cough or shortness of breath      Neuro   No loss of consciousness, dizziness, seizures      Physical Exam:  Blood pressure 131/76, pulse 73, temperature 97.9 °F (36.6 °C), temperature source Temporal, resp. rate 16, height 5' 6\" (1.676 m), weight 156 lb 3.2 oz (70.9 kg), SpO2 96 %. GEN: No apparent distress. Alert and oriented and responds to all questions appropriately. NEUROLOGIC:  No focal neurologic deficits. Strength and sensation grossly intact. Coordination and gait grossly intact. EXT: Well perfused. No edema.   SKIN: No obvious rashes. Lungs clear to auscultation bilaterally  CV regular rate rhythm no murmur  HEENT clear tympanic membrane clear mucosa clear oromucosa       Assessment and Plan     Hypertriglyceridemia  High in the past but 548 and 7/2019  Pravastatin 40 mg since that  Lipid panel today    Anxiety  Happy with Prozac 40 mg. Reflux. Has been an intermittent problem since she had Covid in January. Is taking Prilosec intermittently. Suggest she take it for solid 1 month and then wean off the PPI    Health maintenance  Fit kit   Mammogram [facility]  Had her Pap smear done recently by GYN    If Labs look good, annual follow-up      ICD-10-CM ICD-9-CM    1. Routine general medical examination at a health care facility  Z00.00 V70.0 LIPID PANEL      CBC WITH AUTOMATED DIFF      METABOLIC PANEL, COMPREHENSIVE      OCCULT BLOOD IMMUNOASSAY,DIAGNOSTIC      LIPID PANEL      CBC WITH AUTOMATED DIFF      METABOLIC PANEL, COMPREHENSIVE      OCCULT BLOOD IMMUNOASSAY,DIAGNOSTIC   2. Colon cancer screening  Z12.11 V76.51 OCCULT BLOOD IMMUNOASSAY,DIAGNOSTIC      OCCULT BLOOD IMMUNOASSAY,DIAGNOSTIC   3. Primary hypertension  I10 401.9 LIPID PANEL      CBC WITH AUTOMATED DIFF      METABOLIC PANEL, COMPREHENSIVE      LIPID PANEL      CBC WITH AUTOMATED DIFF      METABOLIC PANEL, COMPREHENSIVE   4. MARIA VICTORIA (generalized anxiety disorder)  F41.1 300.02    5. Hypertriglyceridemia  E78.1 272.1        AVS given.  Pt expressed understanding of instructions

## 2022-03-07 NOTE — PROGRESS NOTES
Health Maintenance Due   Topic Date Due    COVID-19 Vaccine (1) Never done    Depression Monitoring  Never done    Shingrix Vaccine Age 50> (1 of 2) Never done    Colorectal Cancer Screening Combo  01/29/2021    Lipid Screen  07/22/2021    Flu Vaccine (1) 09/01/2021    Breast Cancer Screen Mammogram  03/10/2022     1. \"Have you been to the ER, urgent care clinic since your last visit? Hospitalized since your last visit? \" No    2. \"Have you seen or consulted any other health care providers outside of the [address] [place] [address] since your last visit? \" No     3. For patients aged 39-70: Has the patient had a colonoscopy / FIT/ Cologuard? No      If the patient is female:    4. For patients aged 41-77: Has the patient had a mammogram within the past 2 years? Yes - Care Gap present. Rooming MA/LPN to request most recent results      5. For patients aged 21-65: Has the patient had a pap smear?  NA - based on age or sex

## 2022-03-08 LAB
ALBUMIN SERPL-MCNC: 4.6 G/DL (ref 3.8–4.8)
ALBUMIN/GLOB SERPL: 1.8 {RATIO} (ref 1.2–2.2)
ALP SERPL-CCNC: 77 IU/L (ref 44–121)
ALT SERPL-CCNC: 27 IU/L (ref 0–32)
AST SERPL-CCNC: 21 IU/L (ref 0–40)
BASOPHILS # BLD AUTO: 0.1 X10E3/UL (ref 0–0.2)
BASOPHILS NFR BLD AUTO: 1 %
BILIRUB SERPL-MCNC: 0.4 MG/DL (ref 0–1.2)
BUN SERPL-MCNC: 16 MG/DL (ref 8–27)
BUN/CREAT SERPL: 17 (ref 12–28)
CALCIUM SERPL-MCNC: 9.6 MG/DL (ref 8.7–10.3)
CHLORIDE SERPL-SCNC: 101 MMOL/L (ref 96–106)
CHOLEST SERPL-MCNC: 177 MG/DL (ref 100–199)
CO2 SERPL-SCNC: 22 MMOL/L (ref 20–29)
CREAT SERPL-MCNC: 0.94 MG/DL (ref 0.57–1)
EGFR: 69 ML/MIN/1.73
EOSINOPHIL # BLD AUTO: 0.6 X10E3/UL (ref 0–0.4)
EOSINOPHIL NFR BLD AUTO: 8 %
ERYTHROCYTE [DISTWIDTH] IN BLOOD BY AUTOMATED COUNT: 13.8 % (ref 11.7–15.4)
GLOBULIN SER CALC-MCNC: 2.5 G/DL (ref 1.5–4.5)
GLUCOSE SERPL-MCNC: 109 MG/DL (ref 65–99)
HCT VFR BLD AUTO: 45.3 % (ref 34–46.6)
HDLC SERPL-MCNC: 51 MG/DL
HGB BLD-MCNC: 15.2 G/DL (ref 11.1–15.9)
IMM GRANULOCYTES # BLD AUTO: 0 X10E3/UL (ref 0–0.1)
IMM GRANULOCYTES NFR BLD AUTO: 0 %
IMP & REVIEW OF LAB RESULTS: NORMAL
INTERPRETATION: NORMAL
LDLC SERPL CALC-MCNC: 96 MG/DL (ref 0–99)
LYMPHOCYTES # BLD AUTO: 3 X10E3/UL (ref 0.7–3.1)
LYMPHOCYTES NFR BLD AUTO: 41 %
MCH RBC QN AUTO: 28.6 PG (ref 26.6–33)
MCHC RBC AUTO-ENTMCNC: 33.6 G/DL (ref 31.5–35.7)
MCV RBC AUTO: 85 FL (ref 79–97)
MONOCYTES # BLD AUTO: 0.5 X10E3/UL (ref 0.1–0.9)
MONOCYTES NFR BLD AUTO: 7 %
NEUTROPHILS # BLD AUTO: 3.1 X10E3/UL (ref 1.4–7)
NEUTROPHILS NFR BLD AUTO: 43 %
PLATELET # BLD AUTO: 273 X10E3/UL (ref 150–450)
POTASSIUM SERPL-SCNC: 3.9 MMOL/L (ref 3.5–5.2)
PROT SERPL-MCNC: 7.1 G/DL (ref 6–8.5)
RBC # BLD AUTO: 5.31 X10E6/UL (ref 3.77–5.28)
SODIUM SERPL-SCNC: 143 MMOL/L (ref 134–144)
TRIGL SERPL-MCNC: 172 MG/DL (ref 0–149)
VLDLC SERPL CALC-MCNC: 30 MG/DL (ref 5–40)
WBC # BLD AUTO: 7.4 X10E3/UL (ref 3.4–10.8)

## 2022-03-13 DIAGNOSIS — E78.1 HYPERTRIGLYCERIDEMIA: ICD-10-CM

## 2022-03-14 RX ORDER — PRAVASTATIN SODIUM 40 MG/1
TABLET ORAL
Qty: 90 TABLET | Refills: 3 | Status: SHIPPED | OUTPATIENT
Start: 2022-03-14

## 2022-03-19 PROBLEM — E78.1 HYPERTRIGLYCERIDEMIA: Status: ACTIVE | Noted: 2020-01-22

## 2022-03-19 PROBLEM — F41.1 GAD (GENERALIZED ANXIETY DISORDER): Status: ACTIVE | Noted: 2017-03-03

## 2022-04-04 RX ORDER — HYDROCHLOROTHIAZIDE 25 MG/1
TABLET ORAL
Qty: 30 TABLET | Refills: 0 | Status: SHIPPED | OUTPATIENT
Start: 2022-04-04 | End: 2022-05-10

## 2022-04-04 RX ORDER — OMEPRAZOLE 40 MG/1
CAPSULE, DELAYED RELEASE ORAL
Qty: 30 CAPSULE | Refills: 1 | Status: SHIPPED | OUTPATIENT
Start: 2022-04-04 | End: 2022-05-10

## 2023-03-16 DIAGNOSIS — E78.1 HYPERTRIGLYCERIDEMIA: ICD-10-CM

## 2023-03-16 DIAGNOSIS — F41.1 GAD (GENERALIZED ANXIETY DISORDER): ICD-10-CM

## 2023-03-16 RX ORDER — FLUOXETINE HYDROCHLORIDE 40 MG/1
CAPSULE ORAL
Qty: 90 CAPSULE | Refills: 0 | Status: SHIPPED | OUTPATIENT
Start: 2023-03-16

## 2023-03-16 RX ORDER — PRAVASTATIN SODIUM 40 MG/1
TABLET ORAL
Qty: 90 TABLET | Refills: 0 | Status: SHIPPED | OUTPATIENT
Start: 2023-03-16

## 2023-03-16 NOTE — TELEPHONE ENCOUNTER
verified. Informed pt of message from provider regarding medications refilled and due for a follow up. Pt verified understanding and made deja for 3/28/23. Pt had no further questions.

## 2023-03-28 ENCOUNTER — OFFICE VISIT (OUTPATIENT)
Dept: FAMILY MEDICINE CLINIC | Age: 63
End: 2023-03-28
Payer: COMMERCIAL

## 2023-03-28 VITALS
DIASTOLIC BLOOD PRESSURE: 87 MMHG | WEIGHT: 161.6 LBS | TEMPERATURE: 98.1 F | BODY MASS INDEX: 25.97 KG/M2 | RESPIRATION RATE: 16 BRPM | OXYGEN SATURATION: 97 % | HEIGHT: 66 IN | HEART RATE: 75 BPM | SYSTOLIC BLOOD PRESSURE: 137 MMHG

## 2023-03-28 DIAGNOSIS — F41.1 GAD (GENERALIZED ANXIETY DISORDER): ICD-10-CM

## 2023-03-28 DIAGNOSIS — K21.9 GASTROESOPHAGEAL REFLUX DISEASE WITHOUT ESOPHAGITIS: ICD-10-CM

## 2023-03-28 DIAGNOSIS — E78.1 HYPERTRIGLYCERIDEMIA: ICD-10-CM

## 2023-03-28 DIAGNOSIS — Z00.00 ROUTINE GENERAL MEDICAL EXAMINATION AT A HEALTH CARE FACILITY: Primary | ICD-10-CM

## 2023-03-28 DIAGNOSIS — Z12.11 COLON CANCER SCREENING: ICD-10-CM

## 2023-03-28 DIAGNOSIS — M77.11 LATERAL EPICONDYLITIS OF RIGHT ELBOW: ICD-10-CM

## 2023-03-28 DIAGNOSIS — I10 PRIMARY HYPERTENSION: ICD-10-CM

## 2023-03-28 PROCEDURE — 3075F SYST BP GE 130 - 139MM HG: CPT | Performed by: FAMILY MEDICINE

## 2023-03-28 PROCEDURE — 3079F DIAST BP 80-89 MM HG: CPT | Performed by: FAMILY MEDICINE

## 2023-03-28 PROCEDURE — 99396 PREV VISIT EST AGE 40-64: CPT | Performed by: FAMILY MEDICINE

## 2023-03-28 RX ORDER — HYDROCHLOROTHIAZIDE 25 MG/1
25 TABLET ORAL DAILY
Qty: 90 TABLET | Refills: 3 | Status: SHIPPED | OUTPATIENT
Start: 2023-03-28

## 2023-03-28 RX ORDER — OMEPRAZOLE 40 MG/1
40 CAPSULE, DELAYED RELEASE ORAL DAILY
Qty: 90 CAPSULE | Refills: 3 | Status: SHIPPED | OUTPATIENT
Start: 2023-03-28

## 2023-03-28 NOTE — PROGRESS NOTES
HPI  Cliff Leblanc is a 58 y.o. female who presents to follow-up on chronic medical issues. Has no complaints. Did see cardiology and get a stress test within the last few months. This was not done for any particular symptom. She was trying to be proactive because of a family history of cardiac disease and all of her brothers. She was told the stress test looked okay    PMHx:  Past Medical History:   Diagnosis Date    Depression 2/26/2010    Hypertension 2/26/2010       Meds:   Current Outpatient Medications   Medication Sig Dispense Refill    hydroCHLOROthiazide (HYDRODIURIL) 25 mg tablet Take 1 Tablet by mouth daily. 90 Tablet 3    omeprazole (PRILOSEC) 40 mg capsule Take 1 Capsule by mouth daily. 90 Capsule 3    FLUoxetine (PROzac) 40 mg capsule TAKE 1 CAPSULE BY MOUTH EVERY DAY FOR MOOD 90 Capsule 0    pravastatin (PRAVACHOL) 40 mg tablet TAKE 1 TABLET BY MOUTH EVERY EVENING 90 Tablet 0    aspirin delayed-release 81 mg tablet Take  by mouth daily. Allergies:   No Known Allergies    Smoker:  Social History     Tobacco Use   Smoking Status Never   Smokeless Tobacco Never       ETOH:   Social History     Substance and Sexual Activity   Alcohol Use No       FH:   Family History   Problem Relation Age of Onset    Heart Disease Brother         cancer       ROS:   As listed in HPI. In addition:  Constitutional:   No headache, fever, fatigue, weight loss or weight gain      Cardiac:    No chest pain      Resp:   No cough or shortness of breath      Neuro   No loss of consciousness, dizziness, seizures      Physical Exam:  Blood pressure 137/87, pulse 75, temperature 98.1 °F (36.7 °C), temperature source Temporal, resp. rate 16, height 5' 6\" (1.676 m), weight 161 lb 9.6 oz (73.3 kg), SpO2 97 %. GEN: No apparent distress. Alert and oriented and responds to all questions appropriately. NEUROLOGIC:  No focal neurologic deficits. Strength and sensation grossly intact. Coordination and gait grossly intact. EXT: Well perfused. No edema. SKIN: No obvious rashes. Lungs clear to auscultation bilaterally  CV regular rate rhythm no murmur  HEENT mildly congested nasal mucosa, shotty lymphadenopathy       Assessment and Plan     Wellness  Pap smear mammogram done at [address] [place] [address]. Get record. Does not appear to have sent in her fit kit last year. She thinks she will go ahead and get a colonoscopy. Referred  Recommended shingles vaccine. Hypertriglyceridemia  High in the past, 548 in 2019  Pravastatin 40 mg. Anxiety  Prozac 40 mg effective. Reflux  Has been unable to come off PPI. Bring this up with gastroenterology. May need EGD    Tennis elbow, right. Mild. Actually having  good day and cannot elicit symptoms on exam.  Provided with exercises and educated on overuse nature of injury      ICD-10-CM ICD-9-CM    1. Routine general medical examination at a health care facility  Z00.00 V70.0 LIPID PANEL      METABOLIC PANEL, COMPREHENSIVE      CBC WITH AUTOMATED DIFF      REFERRAL TO GASTROENTEROLOGY      2. MARIA VICTORIA (generalized anxiety disorder)  F41.1 300.02       3. Hypertriglyceridemia  E78.1 272.1       4. Colon cancer screening  Z12.11 V76.51 REFERRAL TO GASTROENTEROLOGY      5. Primary hypertension  I10 401.9 LIPID PANEL      METABOLIC PANEL, COMPREHENSIVE      CBC WITH AUTOMATED DIFF      6. Lateral epicondylitis of right elbow  M77.11 726.32       7. Gastroesophageal reflux disease without esophagitis  K21.9 530.81 REFERRAL TO GASTROENTEROLOGY          AVS given.  Pt expressed understanding of instructions

## 2023-03-28 NOTE — LETTER
3/28/2023 8:57 AM    Ms. Dillan Will  [address] [id number]            Dear Medical Records    Please fax us the most recent pap smear results, so that we may update the patient's records for continuity of care.      Our fax number: [phone]    Patient:   Dillan Will  1960                        Sincerely,      Altaf Thompson MD

## 2023-03-28 NOTE — PROGRESS NOTES
Health Maintenance Due   Topic Date Due    COVID-19 Vaccine (1) Never done    Shingles Vaccine (1 of 2) Never done    Colorectal Cancer Screening Combo  01/29/2021    Flu Vaccine (1) 08/01/2022    Cervical cancer screen  12/07/2022    Depression Monitoring  03/07/2023    Lipid Screen  03/07/2023     1. \"Have you been to the ER, urgent care clinic since your last visit? Hospitalized since your last visit? \" No    2. \"Have you seen or consulted any other health care providers outside of the [address] [place] [address] since your last visit? \" No     3. For patients aged 39-70: Has the patient had a colonoscopy / FIT/ Cologuard? No      If the patient is female:    4. For patients aged 41-77: Has the patient had a mammogram within the past 2 years? Yes - Care Gap present. Most recent result on file      5. For patients aged 21-65: Has the patient had a pap smear? Yes - Care Gap present. Rooming MA/LPN to request most recent results yes.  [facility]

## 2023-03-29 LAB
ALBUMIN SERPL-MCNC: 4 G/DL (ref 3.5–5)
ALBUMIN/GLOB SERPL: 1.2 (ref 1.1–2.2)
ALP SERPL-CCNC: 73 U/L (ref 45–117)
ALT SERPL-CCNC: 37 U/L (ref 12–78)
ANION GAP SERPL CALC-SCNC: 2 MMOL/L (ref 5–15)
AST SERPL-CCNC: 17 U/L (ref 15–37)
BASOPHILS # BLD: 0 K/UL (ref 0–0.1)
BASOPHILS NFR BLD: 1 % (ref 0–1)
BILIRUB SERPL-MCNC: 0.4 MG/DL (ref 0.2–1)
BUN SERPL-MCNC: 17 MG/DL (ref 6–20)
BUN/CREAT SERPL: 22 (ref 12–20)
CALCIUM SERPL-MCNC: 10.2 MG/DL (ref 8.5–10.1)
CHLORIDE SERPL-SCNC: 101 MMOL/L (ref 97–108)
CHOLEST SERPL-MCNC: 197 MG/DL
CO2 SERPL-SCNC: 32 MMOL/L (ref 21–32)
CREAT SERPL-MCNC: 0.76 MG/DL (ref 0.55–1.02)
DIFFERENTIAL METHOD BLD: ABNORMAL
EOSINOPHIL # BLD: 0.3 K/UL (ref 0–0.4)
EOSINOPHIL NFR BLD: 5 % (ref 0–7)
ERYTHROCYTE [DISTWIDTH] IN BLOOD BY AUTOMATED COUNT: 13.4 % (ref 11.5–14.5)
GLOBULIN SER CALC-MCNC: 3.4 G/DL (ref 2–4)
GLUCOSE SERPL-MCNC: 99 MG/DL (ref 65–100)
HCT VFR BLD AUTO: 47.1 % (ref 35–47)
HDLC SERPL-MCNC: 51 MG/DL
HDLC SERPL: 3.9 (ref 0–5)
HGB BLD-MCNC: 15.1 G/DL (ref 11.5–16)
IMM GRANULOCYTES # BLD AUTO: 0 K/UL (ref 0–0.04)
IMM GRANULOCYTES NFR BLD AUTO: 0 % (ref 0–0.5)
LDLC SERPL CALC-MCNC: 80.4 MG/DL (ref 0–100)
LYMPHOCYTES # BLD: 2.8 K/UL (ref 0.8–3.5)
LYMPHOCYTES NFR BLD: 38 % (ref 12–49)
MCH RBC QN AUTO: 28.7 PG (ref 26–34)
MCHC RBC AUTO-ENTMCNC: 32.1 G/DL (ref 30–36.5)
MCV RBC AUTO: 89.5 FL (ref 80–99)
MONOCYTES # BLD: 0.5 K/UL (ref 0–1)
MONOCYTES NFR BLD: 7 % (ref 5–13)
NEUTS SEG # BLD: 3.7 K/UL (ref 1.8–8)
NEUTS SEG NFR BLD: 49 % (ref 32–75)
NRBC # BLD: 0 K/UL (ref 0–0.01)
NRBC BLD-RTO: 0 PER 100 WBC
PLATELET # BLD AUTO: 263 K/UL (ref 150–400)
PMV BLD AUTO: 10.5 FL (ref 8.9–12.9)
POTASSIUM SERPL-SCNC: 4.4 MMOL/L (ref 3.5–5.1)
PROT SERPL-MCNC: 7.4 G/DL (ref 6.4–8.2)
RBC # BLD AUTO: 5.26 M/UL (ref 3.8–5.2)
SODIUM SERPL-SCNC: 135 MMOL/L (ref 136–145)
TRIGL SERPL-MCNC: 328 MG/DL (ref ?–150)
VLDLC SERPL CALC-MCNC: 65.6 MG/DL
WBC # BLD AUTO: 7.4 K/UL (ref 3.6–11)

## 2023-04-04 ENCOUNTER — DOCUMENTATION ONLY (OUTPATIENT)
Dept: FAMILY MEDICINE CLINIC | Age: 63
End: 2023-04-04

## 2023-07-12 ENCOUNTER — CLINICAL DOCUMENTATION (OUTPATIENT)
Dept: OTHER | Facility: CLINIC | Age: 63
End: 2023-07-12

## 2024-05-03 RX ORDER — HYDROCHLOROTHIAZIDE 25 MG/1
TABLET ORAL
Qty: 90 TABLET | Refills: 0 | Status: SHIPPED | OUTPATIENT
Start: 2024-05-03

## 2024-05-21 DIAGNOSIS — F41.1 GENERALIZED ANXIETY DISORDER: ICD-10-CM

## 2024-05-21 RX ORDER — FLUOXETINE HYDROCHLORIDE 40 MG/1
CAPSULE ORAL
Qty: 90 CAPSULE | Refills: 3 | Status: SHIPPED | OUTPATIENT
Start: 2024-05-21

## 2024-06-10 DIAGNOSIS — E78.1 PURE HYPERGLYCERIDEMIA: ICD-10-CM

## 2024-06-10 RX ORDER — PRAVASTATIN SODIUM 40 MG
TABLET ORAL
Qty: 90 TABLET | Refills: 0 | Status: SHIPPED | OUTPATIENT
Start: 2024-06-10

## 2024-09-04 RX ORDER — HYDROCHLOROTHIAZIDE 25 MG/1
TABLET ORAL
Qty: 90 TABLET | Refills: 0 | OUTPATIENT
Start: 2024-09-04

## 2024-09-05 RX ORDER — HYDROCHLOROTHIAZIDE 25 MG/1
25 TABLET ORAL DAILY
Qty: 90 TABLET | Refills: 0 | Status: SHIPPED | OUTPATIENT
Start: 2024-09-05

## 2024-09-05 NOTE — TELEPHONE ENCOUNTER
Refill request (pt came into office)    Pt is out    hydroCHLOROthiazide (HYDRODIURIL) 25 MG tablet      [facility]    Pt has apt on 09/23

## 2024-09-09 DIAGNOSIS — E78.1 PURE HYPERGLYCERIDEMIA: ICD-10-CM

## 2024-09-09 RX ORDER — PRAVASTATIN SODIUM 40 MG
TABLET ORAL
Qty: 90 TABLET | Refills: 0 | Status: SHIPPED | OUTPATIENT
Start: 2024-09-09

## 2024-09-23 ENCOUNTER — OFFICE VISIT (OUTPATIENT)
Age: 64
End: 2024-09-23

## 2024-09-23 VITALS
WEIGHT: 171.6 LBS | HEART RATE: 78 BPM | HEIGHT: 66 IN | SYSTOLIC BLOOD PRESSURE: 129 MMHG | OXYGEN SATURATION: 98 % | RESPIRATION RATE: 16 BRPM | BODY MASS INDEX: 27.58 KG/M2 | TEMPERATURE: 97.5 F | DIASTOLIC BLOOD PRESSURE: 78 MMHG

## 2024-09-23 DIAGNOSIS — E78.1 PURE HYPERGLYCERIDEMIA: ICD-10-CM

## 2024-09-23 DIAGNOSIS — I10 PRIMARY HYPERTENSION: Primary | ICD-10-CM

## 2024-09-23 DIAGNOSIS — Z00.00 ROUTINE GENERAL MEDICAL EXAMINATION AT A HEALTH CARE FACILITY: ICD-10-CM
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
## 2024-09-23 PROBLEM — S16.1XXA STRAIN OF NECK MUSCLE: Status: ACTIVE | Noted: 2021-06-29

## 2024-09-23 RX ORDER — HYDROCHLOROTHIAZIDE 25 MG/1
25 TABLET ORAL DAILY
Qty: 90 TABLET | Refills: 3 | Status: SHIPPED | OUTPATIENT
Start: 2024-09-23

## 2024-09-23 RX ORDER — PRAVASTATIN SODIUM 40 MG
40 TABLET ORAL EVERY EVENING
Qty: 90 TABLET | Refills: 3 | Status: SHIPPED | OUTPATIENT
Start: 2024-09-23

## 2024-09-23 SDOH — ECONOMIC STABILITY: FOOD INSECURITY: WITHIN THE PAST 12 MONTHS, YOU WORRIED THAT YOUR FOOD WOULD RUN OUT BEFORE YOU GOT MONEY TO BUY MORE.: NEVER TRUE

## 2024-09-23 SDOH — ECONOMIC STABILITY: FOOD INSECURITY: WITHIN THE PAST 12 MONTHS, THE FOOD YOU BOUGHT JUST DIDN'T LAST AND YOU DIDN'T HAVE MONEY TO GET MORE.: NEVER TRUE

## 2024-09-23 SDOH — ECONOMIC STABILITY: INCOME INSECURITY: HOW HARD IS IT FOR YOU TO PAY FOR THE VERY BASICS LIKE FOOD, HOUSING, MEDICAL CARE, AND HEATING?: NOT HARD AT ALL

## 2024-09-23 ASSESSMENT — PATIENT HEALTH QUESTIONNAIRE - PHQ9
8. MOVING OR SPEAKING SO SLOWLY THAT OTHER PEOPLE COULD HAVE NOTICED. OR THE OPPOSITE, BEING SO FIGETY OR RESTLESS THAT YOU HAVE BEEN MOVING AROUND A LOT MORE THAN USUAL: NOT AT ALL
1. LITTLE INTEREST OR PLEASURE IN DOING THINGS: NOT AT ALL
SUM OF ALL RESPONSES TO PHQ QUESTIONS 1-9: 0
3. TROUBLE FALLING OR STAYING ASLEEP: NOT AT ALL
10. IF YOU CHECKED OFF ANY PROBLEMS, HOW DIFFICULT HAVE THESE PROBLEMS MADE IT FOR YOU TO DO YOUR WORK, TAKE CARE OF THINGS AT HOME, OR GET ALONG WITH OTHER PEOPLE: NOT DIFFICULT AT ALL
SUM OF ALL RESPONSES TO PHQ QUESTIONS 1-9: 0
5. POOR APPETITE OR OVEREATING: NOT AT ALL
SUM OF ALL RESPONSES TO PHQ9 QUESTIONS 1 & 2: 0
SUM OF ALL RESPONSES TO PHQ QUESTIONS 1-9: 0
4. FEELING TIRED OR HAVING LITTLE ENERGY: NOT AT ALL
6. FEELING BAD ABOUT YOURSELF - OR THAT YOU ARE A FAILURE OR HAVE LET YOURSELF OR YOUR FAMILY DOWN: NOT AT ALL
9. THOUGHTS THAT YOU WOULD BE BETTER OFF DEAD, OR OF HURTING YOURSELF: NOT AT ALL
7. TROUBLE CONCENTRATING ON THINGS, SUCH AS READING THE NEWSPAPER OR WATCHING TELEVISION: NOT AT ALL
SUM OF ALL RESPONSES TO PHQ QUESTIONS 1-9: 0
2. FEELING DOWN, DEPRESSED OR HOPELESS: NOT AT ALL

## 2024-09-24 LAB
ALBUMIN SERPL-MCNC: 4.3 G/DL (ref 3.9–4.9)
ALP SERPL-CCNC: 85 IU/L (ref 44–121)
ALT SERPL-CCNC: 28 IU/L (ref 0–32)
AST SERPL-CCNC: 24 IU/L (ref 0–40)
BASOPHILS # BLD AUTO: 0 X10E3/UL (ref 0–0.2)
BASOPHILS NFR BLD AUTO: 1 %
BILIRUB SERPL-MCNC: 0.3 MG/DL (ref 0–1.2)
BUN SERPL-MCNC: 21 MG/DL (ref 8–27)
BUN/CREAT SERPL: 29 (ref 12–28)
CALCIUM SERPL-MCNC: 9.4 MG/DL (ref 8.7–10.3)
CHLORIDE SERPL-SCNC: 98 MMOL/L (ref 96–106)
CHOLEST SERPL-MCNC: 207 MG/DL (ref 100–199)
CO2 SERPL-SCNC: 27 MMOL/L (ref 20–29)
CREAT SERPL-MCNC: 0.72 MG/DL (ref 0.57–1)
EGFRCR SERPLBLD CKD-EPI 2021: 93 ML/MIN/1.73
EOSINOPHIL # BLD AUTO: 0.1 X10E3/UL (ref 0–0.4)
EOSINOPHIL NFR BLD AUTO: 2 %
ERYTHROCYTE [DISTWIDTH] IN BLOOD BY AUTOMATED COUNT: 13.3 % (ref 11.7–15.4)
GLOBULIN SER CALC-MCNC: 2.7 G/DL (ref 1.5–4.5)
GLUCOSE SERPL-MCNC: 94 MG/DL (ref 70–99)
HBA1C MFR BLD: 6.3 % (ref 4.8–5.6)
HCT VFR BLD AUTO: 48 % (ref 34–46.6)
HDLC SERPL-MCNC: 42 MG/DL
HGB BLD-MCNC: 15.2 G/DL (ref 11.1–15.9)
IMM GRANULOCYTES # BLD AUTO: 0 X10E3/UL (ref 0–0.1)
IMM GRANULOCYTES NFR BLD AUTO: 1 %
IMP & REVIEW OF LAB RESULTS: NORMAL
LDLC SERPL CALC-MCNC: 117 MG/DL (ref 0–99)
LYMPHOCYTES # BLD AUTO: 2.6 X10E3/UL (ref 0.7–3.1)
LYMPHOCYTES NFR BLD AUTO: 44 %
MCH RBC QN AUTO: 28.3 PG (ref 26.6–33)
MCHC RBC AUTO-ENTMCNC: 31.7 G/DL (ref 31.5–35.7)
MCV RBC AUTO: 89 FL (ref 79–97)
MONOCYTES # BLD AUTO: 0.4 X10E3/UL (ref 0.1–0.9)
MONOCYTES NFR BLD AUTO: 7 %
NEUTROPHILS # BLD AUTO: 2.8 X10E3/UL (ref 1.4–7)
NEUTROPHILS NFR BLD AUTO: 45 %
PLATELET # BLD AUTO: 270 X10E3/UL (ref 150–450)
POTASSIUM SERPL-SCNC: 4.1 MMOL/L (ref 3.5–5.2)
PROT SERPL-MCNC: 7 G/DL (ref 6–8.5)
RBC # BLD AUTO: 5.38 X10E6/UL (ref 3.77–5.28)
SODIUM SERPL-SCNC: 141 MMOL/L (ref 134–144)
TRIGL SERPL-MCNC: 276 MG/DL (ref 0–149)
TSH SERPL DL<=0.005 MIU/L-ACNC: 2.39 UIU/ML (ref 0.45–4.5)
VLDLC SERPL CALC-MCNC: 48 MG/DL (ref 5–40)
WBC # BLD AUTO: 6 X10E3/UL (ref 3.4–10.8)

## 2025-05-13 ENCOUNTER — OFFICE VISIT (OUTPATIENT)
Age: 65
End: 2025-05-13
Payer: MEDICARE

## 2025-05-13 VITALS
DIASTOLIC BLOOD PRESSURE: 88 MMHG | RESPIRATION RATE: 16 BRPM | OXYGEN SATURATION: 96 % | BODY MASS INDEX: 27.93 KG/M2 | TEMPERATURE: 98.1 F | WEIGHT: 173.8 LBS | HEART RATE: 72 BPM | HEIGHT: 66 IN | SYSTOLIC BLOOD PRESSURE: 168 MMHG

## 2025-05-13 DIAGNOSIS — R73.9 ELEVATED BLOOD SUGAR: ICD-10-CM

## 2025-05-13 DIAGNOSIS — L03.116 CELLULITIS OF LEFT LEG: ICD-10-CM

## 2025-05-13 DIAGNOSIS — I10 PRIMARY HYPERTENSION: Primary | ICD-10-CM

## 2025-05-13 PROCEDURE — 1036F TOBACCO NON-USER: CPT | Performed by: FAMILY MEDICINE

## 2025-05-13 PROCEDURE — 3017F COLORECTAL CA SCREEN DOC REV: CPT | Performed by: FAMILY MEDICINE

## 2025-05-13 PROCEDURE — 3078F DIAST BP <80 MM HG: CPT | Performed by: FAMILY MEDICINE

## 2025-05-13 PROCEDURE — 99214 OFFICE O/P EST MOD 30 MIN: CPT | Performed by: FAMILY MEDICINE

## 2025-05-13 PROCEDURE — G8419 CALC BMI OUT NRM PARAM NOF/U: HCPCS | Performed by: FAMILY MEDICINE

## 2025-05-13 PROCEDURE — G8427 DOCREV CUR MEDS BY ELIG CLIN: HCPCS | Performed by: FAMILY MEDICINE

## 2025-05-13 PROCEDURE — 1090F PRES/ABSN URINE INCON ASSESS: CPT | Performed by: FAMILY MEDICINE

## 2025-05-13 PROCEDURE — 1123F ACP DISCUSS/DSCN MKR DOCD: CPT | Performed by: FAMILY MEDICINE

## 2025-05-13 PROCEDURE — 3077F SYST BP >= 140 MM HG: CPT | Performed by: FAMILY MEDICINE

## 2025-05-13 PROCEDURE — G8400 PT W/DXA NO RESULTS DOC: HCPCS | Performed by: FAMILY MEDICINE

## 2025-05-13 RX ORDER — SULFAMETHOXAZOLE AND TRIMETHOPRIM 800; 160 MG/1; MG/1
1 TABLET ORAL 2 TIMES DAILY
Qty: 14 TABLET | Refills: 0 | Status: SHIPPED | OUTPATIENT
Start: 2025-05-13 | End: 2025-05-20

## 2025-05-13 RX ORDER — CARVEDILOL 3.12 MG/1
3.12 TABLET ORAL 2 TIMES DAILY
Qty: 180 TABLET | Refills: 1 | Status: SHIPPED | OUTPATIENT
Start: 2025-05-13

## 2025-05-13 SDOH — ECONOMIC STABILITY: FOOD INSECURITY: WITHIN THE PAST 12 MONTHS, THE FOOD YOU BOUGHT JUST DIDN'T LAST AND YOU DIDN'T HAVE MONEY TO GET MORE.: NEVER TRUE

## 2025-05-13 SDOH — ECONOMIC STABILITY: FOOD INSECURITY: WITHIN THE PAST 12 MONTHS, YOU WORRIED THAT YOUR FOOD WOULD RUN OUT BEFORE YOU GOT MONEY TO BUY MORE.: NEVER TRUE

## 2025-05-13 ASSESSMENT — PATIENT HEALTH QUESTIONNAIRE - PHQ9
SUM OF ALL RESPONSES TO PHQ QUESTIONS 1-9: 0
1. LITTLE INTEREST OR PLEASURE IN DOING THINGS: NOT AT ALL
7. TROUBLE CONCENTRATING ON THINGS, SUCH AS READING THE NEWSPAPER OR WATCHING TELEVISION: NOT AT ALL
6. FEELING BAD ABOUT YOURSELF - OR THAT YOU ARE A FAILURE OR HAVE LET YOURSELF OR YOUR FAMILY DOWN: NOT AT ALL
SUM OF ALL RESPONSES TO PHQ QUESTIONS 1-9: 0
5. POOR APPETITE OR OVEREATING: NOT AT ALL
SUM OF ALL RESPONSES TO PHQ QUESTIONS 1-9: 0
3. TROUBLE FALLING OR STAYING ASLEEP: NOT AT ALL
4. FEELING TIRED OR HAVING LITTLE ENERGY: NOT AT ALL
9. THOUGHTS THAT YOU WOULD BE BETTER OFF DEAD, OR OF HURTING YOURSELF: NOT AT ALL
SUM OF ALL RESPONSES TO PHQ QUESTIONS 1-9: 0
10. IF YOU CHECKED OFF ANY PROBLEMS, HOW DIFFICULT HAVE THESE PROBLEMS MADE IT FOR YOU TO DO YOUR WORK, TAKE CARE OF THINGS AT HOME, OR GET ALONG WITH OTHER PEOPLE: NOT DIFFICULT AT ALL
2. FEELING DOWN, DEPRESSED OR HOPELESS: NOT AT ALL
8. MOVING OR SPEAKING SO SLOWLY THAT OTHER PEOPLE COULD HAVE NOTICED. OR THE OPPOSITE, BEING SO FIGETY OR RESTLESS THAT YOU HAVE BEEN MOVING AROUND A LOT MORE THAN USUAL: NOT AT ALL

## 2025-05-15 ENCOUNTER — RESULTS FOLLOW-UP (OUTPATIENT)
Age: 65
End: 2025-05-15

## 2025-05-16 NOTE — PROGRESS NOTES
Chief Complaint   Patient presents with    Insect Bite         Health Maintenance Due   Topic Date Due    Pneumococcal 50+ years Vaccine (1 of 1 - PCV) Never done    DEXA (modify frequency per FRAX score)  Never done         \"Have you been to the ER, urgent care clinic since your last visit?  Hospitalized since your last visit?\"    NO    “Have you seen or consulted any other health care providers outside of [facility] since your last visit?”    GYN           
no joint tenderness, deformity or swelling  Skin - left leg wound, central scab with surrounding redness, some warmth    Assessment/ Plan:   1. Primary hypertension  -add carvedilol to help improve BP readings, continue to monitor at home  - carvedilol (COREG) 3.125 MG tablet; Take 1 tablet by mouth 2 times daily  Dispense: 180 tablet; Refill: 1  - TSH; Future  - Lipid Panel; Future  - Comprehensive Metabolic Panel; Future  - CBC with Auto Differential; Future    2. Cellulitis of left leg  -take medication  - sulfamethoxazole-trimethoprim (BACTRIM DS) 800-160 MG per tablet; Take 1 tablet by mouth 2 times daily for 7 days  Dispense: 14 tablet; Refill: 0    3. Elevated blood sugar  - Hemoglobin A1C; Future       Assessment & Plan  1. Suspected spider bite.  - The etiology of the lesion is uncertain, with possibilities including a spider bite or an infected scratch.  - The lesion exhibits erythema and slight streaking, with a noted warmth.  - Discussion included the need for antibiotic therapy due to the redness and streaking. Advised to keep the lesion clean and dry without covering or applying Neosporin.  - A prescription for antibiotics will be sent to the pharmacy.    2. Elevated blood pressure.  - Blood pressure readings have been consistently high despite long-term use of hydrochlorothiazide.  - Reports experiencing palpitations.  - Discussed the addition of a second antihypertensive medication to her current regimen.  - The prescription will be called into the pharmacy.    Return in about 1 year (around 5/13/2026), or if symptoms worsen or fail to improve.    I have discussed the diagnosis with the patient and the intended plan as seen in the above orders.  The patient has received an after-visit summary and questions were answered concerning future plans.     Medication Side Effects and Warnings were discussed with patient: yes  Patient Labs were reviewed: yes  Patient Past Records were reviewed:  yes    The

## 2025-07-15 ENCOUNTER — OFFICE VISIT (OUTPATIENT)
Age: 65
End: 2025-07-15
Payer: MEDICARE

## 2025-07-15 VITALS
BODY MASS INDEX: 28.25 KG/M2 | WEIGHT: 175.8 LBS | TEMPERATURE: 97.8 F | HEART RATE: 68 BPM | HEIGHT: 66 IN | DIASTOLIC BLOOD PRESSURE: 78 MMHG | RESPIRATION RATE: 17 BRPM | SYSTOLIC BLOOD PRESSURE: 139 MMHG | OXYGEN SATURATION: 98 %

## 2025-07-15 DIAGNOSIS — M77.11 LATERAL EPICONDYLITIS OF RIGHT ELBOW: Primary | ICD-10-CM

## 2025-07-15 DIAGNOSIS — M54.50 ACUTE RIGHT-SIDED LOW BACK PAIN WITHOUT SCIATICA: ICD-10-CM

## 2025-07-15 DIAGNOSIS — M25.551 RIGHT HIP PAIN: ICD-10-CM

## 2025-07-15 PROCEDURE — 3075F SYST BP GE 130 - 139MM HG: CPT | Performed by: FAMILY MEDICINE

## 2025-07-15 PROCEDURE — 1123F ACP DISCUSS/DSCN MKR DOCD: CPT | Performed by: FAMILY MEDICINE

## 2025-07-15 PROCEDURE — 3078F DIAST BP <80 MM HG: CPT | Performed by: FAMILY MEDICINE

## 2025-07-15 PROCEDURE — 99213 OFFICE O/P EST LOW 20 MIN: CPT | Performed by: FAMILY MEDICINE

## 2025-07-15 NOTE — PROGRESS NOTES
3. Acute right-sided low back pain without sciatica  M54.50 XR LUMBAR SPINE (2-3 VIEWS)            AVS given. Pt expressed understanding of instructions

## 2025-07-15 NOTE — PROGRESS NOTES
Health Maintenance Due   Topic Date Due    Pneumococcal 50+ years Vaccine (1 of 1 - PCV) Never done    DEXA (modify frequency per FRAX score)  Never done    Annual Wellness Visit (Medicare Advantage)  Never done

## 2025-07-16 ENCOUNTER — HOSPITAL ENCOUNTER (OUTPATIENT)
Facility: HOSPITAL | Age: 65
Discharge: HOME | End: 2025-07-19
Payer: MEDICARE

## 2025-07-16 DIAGNOSIS — M25.551 RIGHT HIP PAIN: ICD-10-CM

## 2025-07-16 DIAGNOSIS — M54.50 ACUTE RIGHT-SIDED LOW BACK PAIN WITHOUT SCIATICA: ICD-10-CM

## 2025-07-16 PROCEDURE — 73502 X-RAY EXAM HIP UNI 2-3 VIEWS: CPT

## 2025-07-16 PROCEDURE — 72100 X-RAY EXAM L-S SPINE 2/3 VWS: CPT

## 2025-07-18 ENCOUNTER — RESULTS FOLLOW-UP (OUTPATIENT)
Age: 65
End: 2025-07-18

## 2025-07-18 NOTE — TELEPHONE ENCOUNTER
Back x-ray shows low back arthritis most pronounced at L4-L5 where the back meets the hips.  There is no evidence of arthritis on hip x-ray.    If pain persists would recommend seeing back orthopedist

## 2025-07-24 ENCOUNTER — TELEPHONE (OUTPATIENT)
Age: 65
End: 2025-07-24

## 2025-07-24 DIAGNOSIS — M54.50 ACUTE RIGHT-SIDED LOW BACK PAIN WITHOUT SCIATICA: ICD-10-CM

## 2025-07-24 DIAGNOSIS — M25.551 RIGHT HIP PAIN: Primary | ICD-10-CM

## 2025-07-24 NOTE — TELEPHONE ENCOUNTER
PT referral was put in for patient to  physical therapy but they don't accept her insurance so she is requesting a new referral.  [phone]

## 2025-07-28 DIAGNOSIS — F41.1 GENERALIZED ANXIETY DISORDER: ICD-10-CM

## 2025-07-28 RX ORDER — FLUOXETINE HYDROCHLORIDE 40 MG/1
CAPSULE ORAL
Qty: 90 CAPSULE | Refills: 3 | Status: SHIPPED | OUTPATIENT
Start: 2025-07-28